# Patient Record
Sex: MALE | Race: WHITE | NOT HISPANIC OR LATINO | URBAN - METROPOLITAN AREA
[De-identification: names, ages, dates, MRNs, and addresses within clinical notes are randomized per-mention and may not be internally consistent; named-entity substitution may affect disease eponyms.]

---

## 2017-10-31 ENCOUNTER — INPATIENT (INPATIENT)
Facility: HOSPITAL | Age: 37
LOS: 5 days | Discharge: HOME | DRG: 137 | End: 2017-11-06
Attending: OTOLARYNGOLOGY | Admitting: OTOLARYNGOLOGY
Payer: COMMERCIAL

## 2017-10-31 VITALS
OXYGEN SATURATION: 95 % | RESPIRATION RATE: 18 BRPM | TEMPERATURE: 98 F | SYSTOLIC BLOOD PRESSURE: 121 MMHG | DIASTOLIC BLOOD PRESSURE: 76 MMHG | WEIGHT: 179.9 LBS | HEART RATE: 94 BPM

## 2017-10-31 LAB
ALBUMIN SERPL ELPH-MCNC: 4.5 G/DL — SIGNIFICANT CHANGE UP (ref 3.3–5)
ALP SERPL-CCNC: 95 U/L — SIGNIFICANT CHANGE UP (ref 40–120)
ALT FLD-CCNC: 11 U/L RC — SIGNIFICANT CHANGE UP (ref 10–45)
ANION GAP SERPL CALC-SCNC: 17 MMOL/L — SIGNIFICANT CHANGE UP (ref 5–17)
APTT BLD: 29.2 SEC — SIGNIFICANT CHANGE UP (ref 27.5–37.4)
AST SERPL-CCNC: 16 U/L — SIGNIFICANT CHANGE UP (ref 10–40)
BASE EXCESS BLDV CALC-SCNC: 2.5 MMOL/L — HIGH (ref -2–2)
BASOPHILS # BLD AUTO: 0 K/UL — SIGNIFICANT CHANGE UP (ref 0–0.2)
BASOPHILS NFR BLD AUTO: 0.3 % — SIGNIFICANT CHANGE UP (ref 0–2)
BILIRUB SERPL-MCNC: 0.9 MG/DL — SIGNIFICANT CHANGE UP (ref 0.2–1.2)
BUN SERPL-MCNC: 15 MG/DL — SIGNIFICANT CHANGE UP (ref 7–23)
CA-I SERPL-SCNC: 1.25 MMOL/L — SIGNIFICANT CHANGE UP (ref 1.12–1.3)
CALCIUM SERPL-MCNC: 10 MG/DL — SIGNIFICANT CHANGE UP (ref 8.4–10.5)
CHLORIDE BLDV-SCNC: 100 MMOL/L — SIGNIFICANT CHANGE UP (ref 96–108)
CHLORIDE SERPL-SCNC: 98 MMOL/L — SIGNIFICANT CHANGE UP (ref 96–108)
CO2 BLDV-SCNC: 30 MMOL/L — SIGNIFICANT CHANGE UP (ref 22–30)
CO2 SERPL-SCNC: 25 MMOL/L — SIGNIFICANT CHANGE UP (ref 22–31)
CREAT SERPL-MCNC: 1.04 MG/DL — SIGNIFICANT CHANGE UP (ref 0.5–1.3)
EOSINOPHIL # BLD AUTO: 0 K/UL — SIGNIFICANT CHANGE UP (ref 0–0.5)
EOSINOPHIL NFR BLD AUTO: 0.1 % — SIGNIFICANT CHANGE UP (ref 0–6)
GAS PNL BLDV: 139 MMOL/L — SIGNIFICANT CHANGE UP (ref 136–145)
GAS PNL BLDV: SIGNIFICANT CHANGE UP
GAS PNL BLDV: SIGNIFICANT CHANGE UP
GLUCOSE BLDV-MCNC: 112 MG/DL — HIGH (ref 70–99)
GLUCOSE SERPL-MCNC: 114 MG/DL — HIGH (ref 70–99)
HCO3 BLDV-SCNC: 28 MMOL/L — SIGNIFICANT CHANGE UP (ref 21–29)
HCT VFR BLD CALC: 42 % — SIGNIFICANT CHANGE UP (ref 39–50)
HCT VFR BLDA CALC: 46 % — SIGNIFICANT CHANGE UP (ref 39–50)
HGB BLD CALC-MCNC: 15 G/DL — SIGNIFICANT CHANGE UP (ref 13–17)
HGB BLD-MCNC: 14.5 G/DL — SIGNIFICANT CHANGE UP (ref 13–17)
INR BLD: 1.12 RATIO — SIGNIFICANT CHANGE UP (ref 0.88–1.16)
LACTATE BLDV-MCNC: 1 MMOL/L — SIGNIFICANT CHANGE UP (ref 0.7–2)
LYMPHOCYTES # BLD AUTO: 1.2 K/UL — SIGNIFICANT CHANGE UP (ref 1–3.3)
LYMPHOCYTES # BLD AUTO: 7.5 % — LOW (ref 13–44)
MCHC RBC-ENTMCNC: 31.5 PG — SIGNIFICANT CHANGE UP (ref 27–34)
MCHC RBC-ENTMCNC: 34.4 GM/DL — SIGNIFICANT CHANGE UP (ref 32–36)
MCV RBC AUTO: 91.7 FL — SIGNIFICANT CHANGE UP (ref 80–100)
MONOCYTES # BLD AUTO: 1.6 K/UL — HIGH (ref 0–0.9)
MONOCYTES NFR BLD AUTO: 10.2 % — SIGNIFICANT CHANGE UP (ref 2–14)
NEUTROPHILS # BLD AUTO: 13.2 K/UL — HIGH (ref 1.8–7.4)
NEUTROPHILS NFR BLD AUTO: 81.9 % — HIGH (ref 43–77)
PCO2 BLDV: 50 MMHG — SIGNIFICANT CHANGE UP (ref 35–50)
PH BLDV: 7.37 — SIGNIFICANT CHANGE UP (ref 7.35–7.45)
PLATELET # BLD AUTO: 192 K/UL — SIGNIFICANT CHANGE UP (ref 150–400)
PO2 BLDV: 25 MMHG — SIGNIFICANT CHANGE UP (ref 25–45)
POTASSIUM BLDV-SCNC: 3.8 MMOL/L — SIGNIFICANT CHANGE UP (ref 3.5–5)
POTASSIUM SERPL-MCNC: 4.1 MMOL/L — SIGNIFICANT CHANGE UP (ref 3.5–5.3)
POTASSIUM SERPL-SCNC: 4.1 MMOL/L — SIGNIFICANT CHANGE UP (ref 3.5–5.3)
PROT SERPL-MCNC: 8.4 G/DL — HIGH (ref 6–8.3)
PROTHROM AB SERPL-ACNC: 12.1 SEC — SIGNIFICANT CHANGE UP (ref 9.8–12.7)
RBC # BLD: 4.59 M/UL — SIGNIFICANT CHANGE UP (ref 4.2–5.8)
RBC # FLD: 12.1 % — SIGNIFICANT CHANGE UP (ref 10.3–14.5)
SAO2 % BLDV: 41 % — LOW (ref 67–88)
SODIUM SERPL-SCNC: 140 MMOL/L — SIGNIFICANT CHANGE UP (ref 135–145)
WBC # BLD: 16.1 K/UL — HIGH (ref 3.8–10.5)
WBC # FLD AUTO: 16.1 K/UL — HIGH (ref 3.8–10.5)

## 2017-10-31 PROCEDURE — 70491 CT SOFT TISSUE NECK W/DYE: CPT | Mod: 26

## 2017-10-31 PROCEDURE — 93010 ELECTROCARDIOGRAM REPORT: CPT | Mod: 59

## 2017-10-31 PROCEDURE — 99285 EMERGENCY DEPT VISIT HI MDM: CPT | Mod: 25

## 2017-10-31 RX ORDER — ACETAMINOPHEN 500 MG
1000 TABLET ORAL ONCE
Qty: 0 | Refills: 0 | Status: COMPLETED | OUTPATIENT
Start: 2017-10-31 | End: 2017-10-31

## 2017-10-31 RX ADMIN — Medication 400 MILLIGRAM(S): at 22:42

## 2017-10-31 NOTE — ED PROVIDER NOTE - NS ED ROS FT
ROS: denies HA, weakness, dizziness, nausea/vomiting, chest pain, SOB, diaphoresis, abdominal pain, back/neck pain, dysuria/hematuria, or rash  +jaw pain, fever

## 2017-10-31 NOTE — ED ADULT NURSE NOTE - OBJECTIVE STATEMENT
25 yo M no pmh no medications taken on a daily basis came to ED c/o jaw pain s/p trip and fall into wall at his house on 07:00 sunday, 10/29/17.  Pt states that he fell into the wall head first, denies LOC.  Went to [facility] yesterday and was told that he would not have surgery for several days, and pt left AMA.  Py had slight fever of 100.1 at [facility] and denies being given antibiotic.  Pt states that swelling and pain got worse today, with redness and swelling developing on the neck below the chin.  Denies CP, back pain, SOB, fevers/chills, n/v/d.  A&Ox4, lungs clear.  Pt had visible swelling noted in the jaw, down the neck and near the clavicle.  Pt has tenderness to palpation in the jaw and has limited ROM of the jaw. safety and comfort maintained. Family present at bedside.  Will continue to monitor.

## 2017-10-31 NOTE — ED PROVIDER NOTE - ATTENDING CONTRIBUTION TO CARE
35yo M with recent mandibular fracture from trauma, presenting with worsening pain, swelling and redness over site, fevers at home.  +Trismus, difficulty talking, and swelling of the left maxillary and mandibular regions: concerning for infection: osteomyelitis / abscess/ indu's angina.  IV clindamycin started and CT obtained, with findings concerning for indu's; ENT consulted in the ED, and patient to be admitted for operative stabilization.

## 2017-10-31 NOTE — ED PROVIDER NOTE - OBJECTIVE STATEMENT
36 y.o. male previously healthy sp fall on Sunday after tripping at home and hit left side of face on table, went to [facility] and found to have left sided "jaw fracture and impacted wisdom tooth". Was told to follow up, not prescribed antibiotics. For past 2 days now with fever 101 at home with progressively worsening left sided facial swelling and redness extending to neck. Last dose tylenol this AM. Unable to open jaw due to pain and swelling.

## 2017-10-31 NOTE — ED PROVIDER NOTE - PHYSICAL EXAMINATION
Gen: Well appearing, NAD, AOx3  Head: NCAT  HEENT: PERRL, MMM, normal conjunctiva. Trismus due to left sided facial swelling. Erythema of anterior neck  Lung: CTAB, no rales, rhonchi or wheezing  CV: S1/S2, no murmurs, rubs or gallops  Abd: soft, NTND, no rebound or guarding  MSK: No CVA tenderness. No edema, no visible deformities  Neuro: No focal neurologic deficits.   Skin: Warm and dry, no evidence of rash  Psych: normal mood and affect

## 2017-10-31 NOTE — ED ADULT TRIAGE NOTE - CHIEF COMPLAINT QUOTE
fractured jaw s.p falling into something, needs surgical repair.  today swelling to left side of jaw and neck started. difficulty maintaining secretions

## 2017-10-31 NOTE — ED PROVIDER NOTE - MEDICAL DECISION MAKING DETAILS
36 y.o. male pw left sided jaw pain and swelling with neck erythema likely 2/2 infection after jaw fracture. Will check labs, fluids, CT neck w/contrast, likely abx and admit.

## 2017-11-01 DIAGNOSIS — K12.2 CELLULITIS AND ABSCESS OF MOUTH: ICD-10-CM

## 2017-11-01 DIAGNOSIS — L03.221 CELLULITIS OF NECK: ICD-10-CM

## 2017-11-01 DIAGNOSIS — S02.642K: ICD-10-CM

## 2017-11-01 DIAGNOSIS — J04.30 SUPRAGLOTTITIS, UNSPECIFIED, WITHOUT OBSTRUCTION: ICD-10-CM

## 2017-11-01 LAB
ANION GAP SERPL CALC-SCNC: 13 MMOL/L — SIGNIFICANT CHANGE UP (ref 5–17)
BASOPHILS # BLD AUTO: 0 K/UL — SIGNIFICANT CHANGE UP (ref 0–0.2)
BASOPHILS NFR BLD AUTO: 0 % — SIGNIFICANT CHANGE UP (ref 0–2)
BLD GP AB SCN SERPL QL: NEGATIVE — SIGNIFICANT CHANGE UP
BUN SERPL-MCNC: 15 MG/DL — SIGNIFICANT CHANGE UP (ref 7–23)
CALCIUM SERPL-MCNC: 9.4 MG/DL — SIGNIFICANT CHANGE UP (ref 8.4–10.5)
CHLORIDE SERPL-SCNC: 99 MMOL/L — SIGNIFICANT CHANGE UP (ref 96–108)
CO2 SERPL-SCNC: 27 MMOL/L — SIGNIFICANT CHANGE UP (ref 22–31)
CREAT SERPL-MCNC: 1.04 MG/DL — SIGNIFICANT CHANGE UP (ref 0.5–1.3)
EOSINOPHIL # BLD AUTO: 0 K/UL — SIGNIFICANT CHANGE UP (ref 0–0.5)
EOSINOPHIL NFR BLD AUTO: 0 % — SIGNIFICANT CHANGE UP (ref 0–6)
GAS PNL BLDA: SIGNIFICANT CHANGE UP
GLUCOSE SERPL-MCNC: 104 MG/DL — HIGH (ref 70–99)
HCT VFR BLD CALC: 38.2 % — LOW (ref 39–50)
HGB BLD-MCNC: 12.9 G/DL — LOW (ref 13–17)
LYMPHOCYTES # BLD AUTO: 1.02 K/UL — SIGNIFICANT CHANGE UP (ref 1–3.3)
LYMPHOCYTES # BLD AUTO: 7 % — LOW (ref 13–44)
MCHC RBC-ENTMCNC: 29.8 PG — SIGNIFICANT CHANGE UP (ref 27–34)
MCHC RBC-ENTMCNC: 33.8 GM/DL — SIGNIFICANT CHANGE UP (ref 32–36)
MCV RBC AUTO: 88.2 FL — SIGNIFICANT CHANGE UP (ref 80–100)
MONOCYTES # BLD AUTO: 1.27 K/UL — HIGH (ref 0–0.9)
MONOCYTES NFR BLD AUTO: 8.7 % — SIGNIFICANT CHANGE UP (ref 2–14)
NEUTROPHILS # BLD AUTO: 12.27 K/UL — HIGH (ref 1.8–7.4)
NEUTROPHILS NFR BLD AUTO: 80 % — HIGH (ref 43–77)
PLATELET # BLD AUTO: 207 K/UL — SIGNIFICANT CHANGE UP (ref 150–400)
POTASSIUM SERPL-MCNC: 4.1 MMOL/L — SIGNIFICANT CHANGE UP (ref 3.5–5.3)
POTASSIUM SERPL-SCNC: 4.1 MMOL/L — SIGNIFICANT CHANGE UP (ref 3.5–5.3)
RBC # BLD: 4.33 M/UL — SIGNIFICANT CHANGE UP (ref 4.2–5.8)
RBC # FLD: 13.8 % — SIGNIFICANT CHANGE UP (ref 10.3–14.5)
RH IG SCN BLD-IMP: POSITIVE — SIGNIFICANT CHANGE UP
RH IG SCN BLD-IMP: POSITIVE — SIGNIFICANT CHANGE UP
SODIUM SERPL-SCNC: 139 MMOL/L — SIGNIFICANT CHANGE UP (ref 135–145)
WBC # BLD: 14.56 K/UL — HIGH (ref 3.8–10.5)
WBC # FLD AUTO: 14.56 K/UL — HIGH (ref 3.8–10.5)

## 2017-11-01 PROCEDURE — 71020: CPT | Mod: 26

## 2017-11-01 PROCEDURE — 71010: CPT | Mod: 26,59

## 2017-11-01 RX ORDER — ONDANSETRON 8 MG/1
4 TABLET, FILM COATED ORAL ONCE
Qty: 0 | Refills: 0 | Status: DISCONTINUED | OUTPATIENT
Start: 2017-11-01 | End: 2017-11-02

## 2017-11-01 RX ORDER — OXYCODONE AND ACETAMINOPHEN 5; 325 MG/1; MG/1
2 TABLET ORAL EVERY 4 HOURS
Qty: 0 | Refills: 0 | Status: DISCONTINUED | OUTPATIENT
Start: 2017-11-01 | End: 2017-11-01

## 2017-11-01 RX ORDER — SODIUM CHLORIDE 9 MG/ML
1000 INJECTION, SOLUTION INTRAVENOUS
Qty: 0 | Refills: 0 | Status: DISCONTINUED | OUTPATIENT
Start: 2017-11-01 | End: 2017-11-03

## 2017-11-01 RX ORDER — SODIUM CHLORIDE 9 MG/ML
1000 INJECTION, SOLUTION INTRAVENOUS ONCE
Qty: 0 | Refills: 0 | Status: COMPLETED | OUTPATIENT
Start: 2017-11-01 | End: 2017-11-01

## 2017-11-01 RX ORDER — OXYCODONE AND ACETAMINOPHEN 5; 325 MG/1; MG/1
1 TABLET ORAL EVERY 4 HOURS
Qty: 0 | Refills: 0 | Status: DISCONTINUED | OUTPATIENT
Start: 2017-11-01 | End: 2017-11-01

## 2017-11-01 RX ORDER — ACETAMINOPHEN 500 MG
650 TABLET ORAL EVERY 6 HOURS
Qty: 0 | Refills: 0 | Status: DISCONTINUED | OUTPATIENT
Start: 2017-11-01 | End: 2017-11-01

## 2017-11-01 RX ORDER — HYDROMORPHONE HYDROCHLORIDE 2 MG/ML
0.5 INJECTION INTRAMUSCULAR; INTRAVENOUS; SUBCUTANEOUS
Qty: 0 | Refills: 0 | Status: DISCONTINUED | OUTPATIENT
Start: 2017-11-01 | End: 2017-11-01

## 2017-11-01 RX ORDER — DEXAMETHASONE 0.5 MG/5ML
10 ELIXIR ORAL EVERY 8 HOURS
Qty: 0 | Refills: 0 | Status: COMPLETED | OUTPATIENT
Start: 2017-11-01 | End: 2017-11-02

## 2017-11-01 RX ORDER — PROPOFOL 10 MG/ML
5 INJECTION, EMULSION INTRAVENOUS
Qty: 500 | Refills: 0 | Status: DISCONTINUED | OUTPATIENT
Start: 2017-11-01 | End: 2017-11-03

## 2017-11-01 RX ORDER — SODIUM CHLORIDE 9 MG/ML
1000 INJECTION INTRAMUSCULAR; INTRAVENOUS; SUBCUTANEOUS
Qty: 0 | Refills: 0 | Status: DISCONTINUED | OUTPATIENT
Start: 2017-11-01 | End: 2017-11-01

## 2017-11-01 RX ORDER — ENOXAPARIN SODIUM 100 MG/ML
40 INJECTION SUBCUTANEOUS DAILY
Qty: 0 | Refills: 0 | Status: DISCONTINUED | OUTPATIENT
Start: 2017-11-01 | End: 2017-11-01

## 2017-11-01 RX ORDER — ONDANSETRON 8 MG/1
4 TABLET, FILM COATED ORAL EVERY 6 HOURS
Qty: 0 | Refills: 0 | Status: DISCONTINUED | OUTPATIENT
Start: 2017-11-01 | End: 2017-11-01

## 2017-11-01 RX ORDER — PIPERACILLIN AND TAZOBACTAM 4; .5 G/20ML; G/20ML
3.38 INJECTION, POWDER, LYOPHILIZED, FOR SOLUTION INTRAVENOUS EVERY 6 HOURS
Qty: 0 | Refills: 0 | Status: DISCONTINUED | OUTPATIENT
Start: 2017-11-01 | End: 2017-11-01

## 2017-11-01 RX ORDER — ENOXAPARIN SODIUM 100 MG/ML
40 INJECTION SUBCUTANEOUS EVERY 24 HOURS
Qty: 0 | Refills: 0 | Status: DISCONTINUED | OUTPATIENT
Start: 2017-11-01 | End: 2017-11-06

## 2017-11-01 RX ORDER — FENTANYL CITRATE 50 UG/ML
1 INJECTION INTRAVENOUS
Qty: 5000 | Refills: 0 | Status: DISCONTINUED | OUTPATIENT
Start: 2017-11-01 | End: 2017-11-03

## 2017-11-01 RX ORDER — CHLORHEXIDINE GLUCONATE 213 G/1000ML
15 SOLUTION TOPICAL THREE TIMES A DAY
Qty: 0 | Refills: 0 | Status: DISCONTINUED | OUTPATIENT
Start: 2017-11-01 | End: 2017-11-03

## 2017-11-01 RX ORDER — HYDROMORPHONE HYDROCHLORIDE 2 MG/ML
0.5 INJECTION INTRAMUSCULAR; INTRAVENOUS; SUBCUTANEOUS
Qty: 0 | Refills: 0 | Status: DISCONTINUED | OUTPATIENT
Start: 2017-11-01 | End: 2017-11-03

## 2017-11-01 RX ORDER — ACETAMINOPHEN 500 MG
1000 TABLET ORAL ONCE
Qty: 0 | Refills: 0 | Status: COMPLETED | OUTPATIENT
Start: 2017-11-01 | End: 2017-11-01

## 2017-11-01 RX ORDER — DEXMEDETOMIDINE HYDROCHLORIDE IN 0.9% SODIUM CHLORIDE 4 UG/ML
0.2 INJECTION INTRAVENOUS
Qty: 200 | Refills: 0 | Status: DISCONTINUED | OUTPATIENT
Start: 2017-11-01 | End: 2017-11-02

## 2017-11-01 RX ORDER — PIPERACILLIN AND TAZOBACTAM 4; .5 G/20ML; G/20ML
3.38 INJECTION, POWDER, LYOPHILIZED, FOR SOLUTION INTRAVENOUS EVERY 8 HOURS
Qty: 0 | Refills: 0 | Status: DISCONTINUED | OUTPATIENT
Start: 2017-11-01 | End: 2017-11-01

## 2017-11-01 RX ORDER — DEXAMETHASONE 0.5 MG/5ML
10 ELIXIR ORAL EVERY 8 HOURS
Qty: 0 | Refills: 0 | Status: DISCONTINUED | OUTPATIENT
Start: 2017-11-01 | End: 2017-11-01

## 2017-11-01 RX ORDER — HYDROMORPHONE HYDROCHLORIDE 2 MG/ML
0.25 INJECTION INTRAMUSCULAR; INTRAVENOUS; SUBCUTANEOUS
Qty: 0 | Refills: 0 | Status: DISCONTINUED | OUTPATIENT
Start: 2017-11-01 | End: 2017-11-01

## 2017-11-01 RX ORDER — PANTOPRAZOLE SODIUM 20 MG/1
40 TABLET, DELAYED RELEASE ORAL EVERY 24 HOURS
Qty: 0 | Refills: 0 | Status: DISCONTINUED | OUTPATIENT
Start: 2017-11-01 | End: 2017-11-03

## 2017-11-01 RX ORDER — METOCLOPRAMIDE HCL 10 MG
10 TABLET ORAL ONCE
Qty: 0 | Refills: 0 | Status: DISCONTINUED | OUTPATIENT
Start: 2017-11-01 | End: 2017-11-02

## 2017-11-01 RX ADMIN — SODIUM CHLORIDE 100 MILLILITER(S): 9 INJECTION INTRAMUSCULAR; INTRAVENOUS; SUBCUTANEOUS at 02:29

## 2017-11-01 RX ADMIN — DEXMEDETOMIDINE HYDROCHLORIDE IN 0.9% SODIUM CHLORIDE 4.08 MICROGRAM(S)/KG/HR: 4 INJECTION INTRAVENOUS at 12:56

## 2017-11-01 RX ADMIN — Medication 10 MILLIGRAM(S): at 14:36

## 2017-11-01 RX ADMIN — Medication 1000 MILLIGRAM(S): at 00:15

## 2017-11-01 RX ADMIN — SODIUM CHLORIDE 4000 MILLILITER(S): 9 INJECTION, SOLUTION INTRAVENOUS at 15:15

## 2017-11-01 RX ADMIN — SODIUM CHLORIDE 4000 MILLILITER(S): 9 INJECTION, SOLUTION INTRAVENOUS at 14:00

## 2017-11-01 RX ADMIN — Medication 10 MILLIGRAM(S): at 21:51

## 2017-11-01 RX ADMIN — Medication 100 MILLIGRAM(S): at 20:16

## 2017-11-01 RX ADMIN — PIPERACILLIN AND TAZOBACTAM 25 GRAM(S): 4; .5 INJECTION, POWDER, LYOPHILIZED, FOR SOLUTION INTRAVENOUS at 07:40

## 2017-11-01 RX ADMIN — Medication 400 MILLIGRAM(S): at 10:20

## 2017-11-01 RX ADMIN — Medication 100 MILLIGRAM(S): at 00:15

## 2017-11-01 RX ADMIN — Medication 1000 MILLIGRAM(S): at 10:30

## 2017-11-01 RX ADMIN — SODIUM CHLORIDE 100 MILLILITER(S): 9 INJECTION INTRAMUSCULAR; INTRAVENOUS; SUBCUTANEOUS at 10:21

## 2017-11-01 RX ADMIN — OXYCODONE AND ACETAMINOPHEN 2 TABLET(S): 5; 325 TABLET ORAL at 05:30

## 2017-11-01 RX ADMIN — PANTOPRAZOLE SODIUM 40 MILLIGRAM(S): 20 TABLET, DELAYED RELEASE ORAL at 16:17

## 2017-11-01 RX ADMIN — OXYCODONE AND ACETAMINOPHEN 2 TABLET(S): 5; 325 TABLET ORAL at 06:14

## 2017-11-01 RX ADMIN — FENTANYL CITRATE 4.08 MICROGRAM(S)/KG/HR: 50 INJECTION INTRAVENOUS at 14:21

## 2017-11-01 RX ADMIN — Medication 102 MILLIGRAM(S): at 06:31

## 2017-11-01 RX ADMIN — PROPOFOL 2.45 MICROGRAM(S)/KG/MIN: 10 INJECTION, EMULSION INTRAVENOUS at 13:08

## 2017-11-01 RX ADMIN — ENOXAPARIN SODIUM 40 MILLIGRAM(S): 100 INJECTION SUBCUTANEOUS at 20:30

## 2017-11-01 NOTE — H&P ADULT - HISTORY OF PRESENT ILLNESS
35 y/o male with no significant PMHx s/p fall on Sunday after tripping at home and hit left side of face on table, went to [facility] and found to have left sided "jaw fracture and impacted wisdom tooth". Was told to follow up, not prescribed antibiotics. For past 2 days now with fever 101 at home with progressively worsening left sided facial swelling and redness extending to neck. Last dose tylenol this AM. Unable to open jaw due to pain and swelling. Pt denies any SOB, n/v, changes in vision, numbness/tingling, headaches, throat pain, neck pain, or issues tolerating secretions. Pt hasnt been able to eat due to pain from trying to open his jaws. Pt states he has been treated for staph infections outpatient intermittently over the past 3 years.

## 2017-11-01 NOTE — CONSULT NOTE ADULT - ASSESSMENT
36 year old male, s/p fall 3 days ago, presents with Santana's angina.     Neuro: pain controlled with IV Tylenol, if needs narcotic, would give low dose.     Resp: closely monitor saturation 99% on RA, does not require supplemental O2, if worsens clinically, would intubate preemptively. I do not know if his clinical course would worsen, but I think he can be watched for now, he will be in PACU (monitored setting).     CV: no issues as far as I know.     GI: Nil Per Os, no oral meds     : intravenous fluid to prevent dehydration.     ID: Piperacillin / Tazobactam to cover gram negative and gram positive organism and clindamycin to cover anaerobes.      Heme: chemical and mechanical venous thromboembolism prophylaxis.     Disposition: PACU (monitored setting), SICU care.     Full support in place.   Discussed with Dr. Tyesha Parrish (intensivist)     Julia Ross   PGY-2   SICU resident   [id number] 36 year old male, s/p fall 3 days ago, presents with Santana's angina.     Neuro: pain controlled with IV Tylenol, if needs narcotic, would give low dose.     Resp: closely monitor saturation 99% on RA, does not require supplemental O2, if worsens clinically, would intubate preemptively. I do not know if his clinical course would worsen, but I think he can be watched for now, he will be in PACU (monitored setting).     CV: no issues as far as I know.     GI: Nil Per Os, no oral meds     : intravenous fluid to prevent dehydration.     ID: Piperacillin / Tazobactam to cover gram negative and gram positive organism and clindamycin to cover anaerobes and prevents release of endotoxin from gram-negative bacteria.     Heme: chemical and mechanical venous thromboembolism prophylaxis.     Disposition: PACU (monitored setting), SICU care.     Full support in place.   Discussed with Dr. Tyesha Parrish (intensivist)     Julia Ross   PGY-2   SICU resident   [id number]

## 2017-11-01 NOTE — PROGRESS NOTE ADULT - ASSESSMENT
36y POD #0 s/p I&D for sublingual abscess and jaw wired shut to correct left displaced ramus fracture, doing well. dressing clean and appears dry.

## 2017-11-01 NOTE — H&P ADULT - NSHPLABSRESULTS_GEN_ALL_CORE
CT of neck w/ con     IMPRESSION:  1.    Comminuted  displaced  fracture  through  the  angle  of  the  mandible  on  the  left  side.    Fracture  lucency  extends  through  the  socket  of  the  left  lower  third  molar  and  traverses  the  mandibular  canal.  2.    Ill-defined  hypoattenuation  for  the  mouth  the  right  side  suggesting  phlegmon/early  abscess  related  to  sublingual  gland  infection;  this  measures  approximately  3  x  1  x  1.5  cm.    Diffuse  infiltration  of  the  superficial  and  deep  subcutaneous  fat  along  the  anterior  neck  incision  bilaterally,  tracking  to  the  anterior  chest  wall  suggesting  overlying  cellulitis.    Hyperemia  of  the  submandibular  glands  and  hyperemic  cervical  lymph  nodes  are  likely  reactive.  3.    Asymmetric  effacement  of  the  vallecula  on  the  right  side  likely  related  to  edema  from  the  floor  of  mouth  infection.    Follow-up  CT  or  MRI  neck  after  therapy  and  resolution  of  symptoms  and/or  direct  endoscopic  visualization  by  ENT  can  be  performed  to  exclude  a  base  of  tongue  lesion.

## 2017-11-01 NOTE — ED ADULT NURSE REASSESSMENT NOTE - NS ED NURSE REASSESS COMMENT FT1
vss. aaox4. Gross neuro intact. Lungs clear throughout bilat. S1 and S2 heard. Abd soft, nontender, nondistended. + bs in all 4 quadrants. Denies urianry s&s. Skin w.d.i. Pt denies chest pain, sob, n,v, dizziness ,weakness, fever, chills. Pt states his pain is better than before. Safety and comfort measures maintained. Awaiting OR. vss. aaox4. Gross neuro intact. Lungs clear throughout bilat. S1 and S2 heard. Abd soft, nontender, nondistended. + bs in all 4 quadrants. Denies urianry s&s. Skin w.d.i. Pt denies chest pain, sob, n,v, dizziness ,weakness, fever, chills. Pt states his pain is better than before. Safety and comfort measures maintained. Awaiting OR. Pt is able to speak slightly in full sentences. Pt is not cyanotic or in distress.

## 2017-11-01 NOTE — H&P ADULT - PROBLEM SELECTOR PLAN 1
-NPO  -IV Fluids  -IV decadron  -IV zosyn/clindamycin  -Antipyretics/pain control  -DVT PPX- Venodynes  -Discussed with Dr. Bartholomew

## 2017-11-01 NOTE — H&P ADULT - NSHPPHYSICALEXAM_GEN_ALL_CORE
PHYSICAL EXAM:  Gen: Awake and alert, sitting up, NAD, well-developed  Head: Normocephalic, Atraumatic  Face: (+) left facial edema and trismus of mouth from pain. (-)erythema/fluctuance, parotid glands soft without mass  Eyes: PERRL, EOMI, no scleral injection  Ears: Right - ear canal clear, TM intact without effusion            Left - ear canal clear, TM intact without effusion  Nose: Nares bilaterally patent, no discharge  Mouth: Mucosa moist, tongue/uvula midline, oropharynx clear. FOM-warm, erythematous, slight TTP. (+) trimus  Neck: Anterior neck erythema and edema descending downwards. Flat, supple, no lymphadenopathy, trachea midline, no masses  Resp: breathing easily, no stridor  CV: No peripheral edema or cyanosis    FOE/LARYNGOSCOPY: Moderate amount of generalized supraglottic edema and erythema. Otherwise, nasopharynx, oropharynx and hypopharynx clear. Airway widely patent. Vocal cords intact and in motion b/l. No evidence of bleeding, obstruction, lesions, masses, pooling of secretion.

## 2017-11-01 NOTE — PROGRESS NOTE ADULT - SUBJECTIVE AND OBJECTIVE BOX
POD/STATUS POST/ENT ISSUE: POD #0 for I&D of sublingual abscess     INTERVAL HPI: 36y male with ludwigs angina, sublingual abscess and left displaced ramus fracture s/p I&D for abscess. Pt     Vital Signs Last 24 Hrs  T(C): 36.6 (01 Nov 2017 20:00), Max: 37.8 (01 Nov 2017 06:14)  T(F): 97.9 (01 Nov 2017 20:00), Max: 100 (01 Nov 2017 06:14)  HR: 51 (01 Nov 2017 20:53) (50 - 89)  BP: 104/56 (01 Nov 2017 20:45) (100/53 - 138/88)  BP(mean): 74 (01 Nov 2017 20:45) (72 - 99)  RR: 12 (01 Nov 2017 20:45) (12 - 20)  SpO2: 100% (01 Nov 2017 20:53) (95% - 100%)    LABS:                        12.9   14.56 )-----------( 207      ( 01 Nov 2017 07:32 )             38.2       PHYSICAL EXAM:  Gen: NAD, well-developed  Head: Normocephalic, Atraumatic  Face: no edema/erythema/fluctuance, parotid glands soft without mass  Eyes: PERRL, EOMI, no scleral injection  Ears: Right - ear canal clear, TM intact without effusion / erythema.  No evidence of any fluid drainage.  No Mastoid tenderness / erythema/ ear bulging            Left - ear canal clear, TM intact without effusion / erythema.  No evidence of any fluid drainage.  No Mastoid tenderness / erythema/ ear bulging  Nose: Nares bilaterally patent, no discharge  Mouth: No Stridor / Drooling / Trismus.  Mucosa moist, tongue/uvula midline, oropharynx clear  Neck: Flat, supple, no lymphadenopathy, trachea midline, no masses  Resp: breathing easily, no stridor  CV: no peripheral edema/cyanosis    Indirect Fiberopic laryngoscopy: (With Scope #2)    IMAGING/ADDITIONAL STUDIES: POD/STATUS POST/ENT ISSUE: POD #0 for I&D of sublingual abscess     INTERVAL HPI: 36y male with ludwigs angina, sublingual abscess and left displaced ramus fracture s/p fall 3days ago. POD #0 for I&D for abscess. Pt seen at bedside sleeping. no complaints, pain well controlled.     Vital Signs Last 24 Hrs  T(C): 36.6 (01 Nov 2017 20:00), Max: 37.8 (01 Nov 2017 06:14)  T(F): 97.9 (01 Nov 2017 20:00), Max: 100 (01 Nov 2017 06:14)  HR: 51 (01 Nov 2017 20:53) (50 - 89)  BP: 104/56 (01 Nov 2017 20:45) (100/53 - 138/88)  BP(mean): 74 (01 Nov 2017 20:45) (72 - 99)  RR: 12 (01 Nov 2017 20:45) (12 - 20)  SpO2: 100% (01 Nov 2017 20:53) (95% - 100%)    LABS:                        12.9   14.56 )-----------( 207      ( 01 Nov 2017 07:32 )             38.2       PHYSICAL EXAM:  Gen: NAD, well-developed  Head: Normocephalic,   Face: no edema/erythema/fluctuance  Eyes:  no scleral injection  Nose: Nares bilaterally patent, no discharge  Mouth: mouth wired shut   Neck: Dressing is clean and dry  Resp: breathing easily, no stridor  CV: no peripheral edema/cyanosis

## 2017-11-01 NOTE — PACU DISCHARGE NOTE - COMMENTS
patient is to be transferred to [facility] as per SICU for management by Dr Bartholomew's team there

## 2017-11-01 NOTE — H&P ADULT - ASSESSMENT
37 y/o male with early lugwigs angina with right side of mouth abscess measuring 3 x 1 x 1.5 cm/anterior neck cellulitis/ Unilateral communicated displaced left sided ramus fracture. FOE indicative of supraglottitis. Per exam-Descending cellulitis of anterior neck and FOM tenderness and mild edema. (+) Left facial edema/ trismus. 35 y/o male with early lugwigs angina with right side of mouth sublingual abscess measuring 3 x 1 x 1.5 cm/anterior neck cellulitis/ Unilateral communicated displaced left sided ramus fracture. FOE indicative of supraglottitis. Per exam-Descending cellulitis of anterior neck and FOM tenderness and mild edema. (+) Left facial edema/ trismus.

## 2017-11-01 NOTE — CONSULT NOTE ADULT - ASSESSMENT
Assessment:   The patient is a 36 year old male previously healthy s/p fall on Sunday after tripping at home and hit left side of face on table, went to [facility] and found to have left sided "jaw fracture and impacted wisdom tooth" who presents to the emergency room at [facility] with a chief complaint of neck and face pain and swelling worsening for the past several hours. Ddx submandibluar abscess, post traumatic neck edema versus more likely Left mandible fracture with new onset submandibular cellulits/phlegmon, acute sinusitis    Plan:  1) ICU for airway observation  2) IV clindamycin  3) decadron 10mg IV q8hr x 6 doses

## 2017-11-01 NOTE — CONSULT NOTE ADULT - NOSE
nasal/sinus endoscopy: maxillary sinus with fluid bilaterally via inferior meatus, sphenoid sinus clear bilaterally

## 2017-11-01 NOTE — CONSULT NOTE ADULT - ATTENDING COMMENTS
I have seen and examined this patient on admission. 36 year old male with Ludwigs angina. The patient is to go to the OR for drainage with ENT. Respiratory status will be monitored closely and patient will be a critical airway.

## 2017-11-01 NOTE — CONSULT NOTE ADULT - COMMENTS
Vital Signs:  · BP Systolic	121 mm Hg  · BP Diastolic	76 mm Hg  · Heart Rate	94 /min  · Respiration Rate (breaths/min)	18 /min  · Temp (F)	98.3 Degrees F  · Temp (C)	36.8 Degrees C  · Temp site	oral  · SpO2 (%)	95 %  · O2 delivery	room air

## 2017-11-01 NOTE — CHART NOTE - NSCHARTNOTEFT_GEN_A_CORE
laryngo [facility]  Head & Neck Surgery Procedure Note      Name:                     Miguel Finch	             Surgeon:	Bruce Bartholomew MD  					  Date of Procedure:    11/01/2017                                   Assistant:  None    Record Number:	    [id number]                                      Anesthesia:  Local Anesthesia       Preoperative diagnosis:	Dysphagia, unspecified (R13.10)  	  Postoperative diagnosis:	Dysphagia, unspecified (R13.10)    Procedure:		Flexible Fiberoptic Laryngoscopy  ([id number])    INDICATION:  The patient is a 36 year old male previously healthy s/p fall on Sunday after tripping at home and hit left side of face on table, went to [facility] and found to have left sided "jaw fracture and impacted wisdom tooth". Was told to follow up, not prescribed antibiotics. For past 2 days now with fever 101 at home with progressively worsening left sided facial swelling and redness extending to neck. Unable to open jaw due to pain and swelling. Patient has difficulty swallowing. He has facial/sinus pain with nasal drainage. Pt states he has been treated for staph infections outpatient intermittently over the past 3 years.    PROCEDURE: The patient was seen and his bilateral nasal cavities were prepped and sprayed with topical anesthesia of neosynephrine.   Following this, a fiberoptic flexible laryngoscope was passed into the left nasal cavity, and then slowly and meticulously moved posteriorly to the nasopharynx.  There was no evidence of clotted blood within the left anterior and posterior superior lateral nasal wall. There was clear mucous within the nasal cavity.  Once at nasopharynx the skull base and lateral walls of the eustachian tubes were examined for lesions and masses.  There was no blood or masses within the nasopharynx. There was mild prominence of the nasopharyngeal soft tissue consistent with inflammatory reaction.  The fiberoptic endoscope was then carefully directed inferiorly and moved to the hypopharynx and glottis.  There was no fullness of the base of tongue.  There was 1-2+ prominence of the tonsils bilaterally (equally) and without exudate. There was no evidence of retropharyngeal erythema or edema.  There was mild  diffuse erythema edema  of the pharyngeal wall and supraglottic structures.  The true vocal cords appeared to be mobile bilaterally.  There was no evidence of pooling of secretions, or obvious aspiration or penetration of liquid into the glottis.  The patient tolerated the procedure well..

## 2017-11-01 NOTE — CONSULT NOTE ADULT - SUBJECTIVE AND OBJECTIVE BOX
SICU CONSULT NOTE    Patient is a 36y old  Male who presents with a chief complaint of Facial swelling.     HPI:  37 y/o male with no significant PMHx s/p fall on Sunday after tripping at home and hit left side of face on table, went to [facility] and found to have left sided "jaw fracture and impacted wisdom tooth". He was told to follow up, not prescribed antibiotics. For past 2 days now with fever 101 at home with progressively worsening left sided facial swelling and redness extending to neck.  Unable to open jaw due to pain and swelling. Pt denies any SOB, n/v, changes in vision, numbness/tingling, headaches, throat pain. He is able to spit out saliva. Pt has not been able to eat due to pain from trying to open his jaws. Pt states he has been treated for staph infections outpatient intermittently over the past 3 years.       PAST MEDICAL & SURGICAL HISTORY:  No pertinent past medical history  No significant past surgical history    FAMILY HISTORY:  No pertinent family history in first degree relatives    SOCIAL HISTORY: not smoker, social alcohol use.     Home meds: none         MEDICATIONS  (STANDING):  clindamycin IVPB 900 milliGRAM(s) IV Intermittent every 8 hours  dexamethasone  IVPB 10 milliGRAM(s) IV Intermittent every 8 hours  piperacillin/tazobactam IVPB. 3.375 Gram(s) IV Intermittent every 6 hours  sodium chloride 0.9%. 1000 milliLiter(s) (100 mL/Hr) IV Continuous <Continuous>    MEDICATIONS  (PRN):  acetaminophen    Suspension 650 milliGRAM(s) Oral every 6 hours PRN For Temp greater than 38 C (100.4 F)  acetaminophen    Suspension. 650 milliGRAM(s) Oral every 6 hours PRN Mild Pain (1 - 3)  ondansetron Injectable 4 milliGRAM(s) IV Push every 6 hours PRN Nausea  oxyCODONE    5 mG/acetaminophen 325 mG 2 Tablet(s) Oral every 4 hours PRN Severe Pain (7 - 10)  oxyCODONE    5 mG/acetaminophen 325 mG 1 Tablet(s) Oral every 4 hours PRN Moderate Pain (4 - 6)    Allergies: No Known Allergies     Vital Signs Last 24 Hrs  T(C): 36.9 (01 Nov 2017 07:36), Max: 37.8 (01 Nov 2017 06:14)  T(F): 98.4 (01 Nov 2017 07:36), Max: 100 (01 Nov 2017 06:14)  HR: 75 (01 Nov 2017 07:36) (75 - 94)  BP: 116/68 (01 Nov 2017 07:36) (116/68 - 138/88)  BP(mean): --  RR: 16 (01 Nov 2017 07:36) (16 - 18)  SpO2: 97% (01 Nov 2017 07:36) (95% - 99%)  Daily     Daily     Gen: Awake and alert, sitting up, NAD, well-developed  Head: Normocephalic, Atraumatic  Face: left facial edema, unable to fully open mouth from pain. no erythema or fluctuance, parotid glands soft without mass  Eyes: no scleral injection  Nose: Nares bilaterally patent, no discharge  Mouth: Mucosa moist, tongue/uvula midline, oropharynx clear. slight TTP. (+) trimus  Neck: Anterior neck erythema and edema descending downwards. Flat, supple, no lymphadenopathy, trachea midline, no masses  Resp: breathing easily, no stridor  CV: No peripheral edema or cyanosis    ENT FOE/LARYNGOSCOPY: Moderate amount of generalized supraglottic edema and erythema. Otherwise, nasopharynx, oropharynx and hypopharynx clear. Airway widely patent. Vocal cords intact and in motion b/l. No evidence of bleeding, obstruction, lesions, masses, pooling of secretion.                              14.5   16.1  )-----------( 192      ( 31 Oct 2017 22:19 )             42.0     11-01    x   |  x   |  15  ----------------------------<  104<H>  x    |  27  |  1.04    Ca    9.4      01 Nov 2017 07:44    TPro  8.4<H>  /  Alb  4.5  /  TBili  0.9  /  DBili  x   /  AST  16  /  ALT  11  /  AlkPhos  95  10-31    PT/INR - ( 31 Oct 2017 22:19 )   PT: 12.1 sec;   INR: 1.12 ratio         PTT - ( 31 Oct 2017 22:19 )  PTT:29.2 sec      IMAGING STUDIES:  1.  Comminuted displaced fracture through the angle of the mandible on the left side.  Fracture lucency extends through the socket of the left lower third molar and traverses the mandibular canal.  2.  Ill-defined hypoattenuation for the mouth the right side suggesting phlegmon/early abscess related to sublingual gland infection; this measures approximately 3 x 1 x 1.5 cm.  Diffuse infiltration of the superficial and deep subcutaneous fat along the anterior neck incision bilaterally, tracking to the anterior chest wall suggesting overlying cellulitis.  Hyperemia of the submandibular glands and hyperemic cervical lymph nodes are likely reactive.  3.  Asymmetric effacement of the vallecula on the right side likely related to edema from the floor of mouth infection.  Follow-up CT or MRI neck after therapy and resolution of symptoms and/or direct endoscopic visualization by ENT can be performed to exclude a base of tongue lesion.

## 2017-11-01 NOTE — CONSULT NOTE ADULT - SUBJECTIVE AND OBJECTIVE BOX
· HPI Objective Statement: 36 y.o. male previously healthy sp fall on Sunday after tripping at home and hit left side of face on table, went to [facility] and found to have left sided "jaw fracture and impacted wisdom tooth". Was told to follow up, not prescribed antibiotics. For past 2 days now with fever 101 at home with progressively worsening left sided facial swelling and redness extending to neck. Last dose tylenol this AM. Unable to open jaw due to pain and swelling.	    HIV:    HIV Status:  · Offered: Declined	    PAST MEDICAL/SURGICAL/FAMILY/SOCIAL HISTORY:    Past Medical History:  No pertinent past medical history.     Past Surgical History:  No significant past surgical history.     Tobacco Usage:  · Tobacco Usage	Unknown if ever smoked	    ALLERGIES AND HOME MEDICATIONS:   Allergies:        Allergies:  	No Known Allergies:     Home Medications:   * Outpatient Medication Status not yet specified · HPI Objective Statement: The patient is a 36 year old male previously healthy s/p fall on Sunday after tripping at home and hit left side of face on table, went to [facility] and found to have left sided "jaw fracture and impacted wisdom tooth". Was told to follow up, not prescribed antibiotics. For past 2 days now with fever 101 at home with progressively worsening left sided facial swelling and redness extending to neck. Unable to open jaw due to pain and swelling. Patient has difficulty swallowing. He has facial/sinus pain with nasal drainage. Pt states he has been treated for staph infections outpatient intermittently over the past 3 years.	    HIV:    HIV Status:  · Offered: Declined	    PAST MEDICAL/SURGICAL/FAMILY/SOCIAL HISTORY:    Past Medical History:  No pertinent past medical history.     Past Surgical History:  No significant past surgical history.     Tobacco Usage:  · Tobacco Usage	Unknown if ever smoked	    ALLERGIES AND HOME MEDICATIONS:   Allergies:        Allergies:  	No Known Allergies:     Home Medications:   * Outpatient Medication Status not yet specified    CBC Full  -    WBC Count : 14.56 K/uL  Hemoglobin : 12.2 g/dL  Hematocrit : 35.3 %  Platelet Count - Automated : 181 K/uL  Mean Cell Volume : 92.0 fl  Mean Cell Hemoglobin : 31.7 pg  Mean Cell Hemoglobin Concentration : 34.5 gm/dL  Auto Neutrophil # : x  Auto Lymphocyte # : x  Auto Monocyte # : x  Auto Eosinophil # : x  Auto Basophil # : x  Auto Neutrophil % : x  Auto Lymphocyte % : x  Auto Monocyte % : x  Auto Eosinophil % : x  Auto Basophil % : x    IMAGING  -------------------------------------------------------------------------------------------------------------------------------------------  EXAM:  CT NECK SOFT TISSUE IC                          PROCEDURE DATE:  10/31/2017      FINDINGS:  There is a comminuted displaced fracture through the angle of the   mandible on the left side.  The fracture lucency extends through the   socket of the left lower third molar and traverses the mandibular canal.    No other fracture is identified.  The temporomandibular joints are intact   bilaterally.    There is ill-defined hypoattenuation in the floor the mouth the right   side suggesting phlegmon/early abscess related to sublingual gland   infection; this measures approximately 3 x 1 x 1.5 cm (2:124 and 602:43).    There is mild associated mass effect in the floor the mouth.  There is   diffuse infiltration of the superficial and deep subcutaneous fat along   the anterior neck and chin bilaterally, that tracks to the anterior chest   wall suggesting overlying cellulitis.  There is reactive thickening of   the platysma.  Inflammatory changes track along the sternocleidomastoid   muscles and strap muscles bilaterally.    The submandibular glands appear hyperinflated, worse on the right, likely   reactive.  There are hyperemic submandibular and internal jugular chain   lymph nodes bilaterally measuring up to 16 x 10 cm at level IIa on the   right and 20 x 8 mm at level Ib on the right, which are likely reactive.    There is no peritonsillar abscess or retropharyngeal fluid.    The parotid glands and thyroid are unremarkable.    There is asymmetric effacement of the vallecula on the right side.  The   pharyngeal airway, laryngeal structures and upper trachea are otherwise   grossly unremarkable.    The cervical vasculature appears unremarkable.    There is mild pleural-parenchymal scarring at the lung apices.    IMPRESSION:   1.  Comminuted displaced fracture through the angle of the mandible on   the left side.  Fracture lucency extends through the socket of the left   lower third molar and traverses the mandibular canal.  2.  Ill-defined hypoattenuation for the mouth the right side suggesting   phlegmon/early abscess related to sublingual gland infection; this   measures approximately 3 x 1 x 1.5 cm.  Diffuse infiltration of the   superficial and deep subcutaneous fat along the anterior neck incision   bilaterally, tracking to the anterior chest wall suggesting overlying   cellulitis.  Hyperemia of the submandibular glands and hyperemic cervical   lymph nodes are likely reactive.  3.  Asymmetric effacement of the vallecula on the right side likely   related to edema from the floor of mouth infection.  Follow-up CT or MRI   neck after therapy and resolution of symptoms and/or direct endoscopic   visualization by ENT can be performed to exclude a base of tongue lesion.    MOHSEN SHAW D.O., RADIOLOGY RESIDENT  This document has been electronically signed.  SHELLEY PARIS M.D.,ATTENDING RADIOLOGIST  This document has been electronically signed. Nov  1 2017  1:16AM    ------------------------------------------------------------------------------------------------------------------------------------------------------------------------------------------------------------------------------------------------------------------------------

## 2017-11-01 NOTE — CHART NOTE - NSCHARTNOTEFT_GEN_A_CORE
[facility]  Head & Neck Surgery Procedure Note    Name:                        Rivka Lieberman	           Surgeon:	Bruce Bartholomew MD  			  Date of procedure:	      09/13/2017	           Assistant:	None    Record Number:	      [id number]	           Anesthesia	Local Anesthesia     Preoperative diagnosis:	Acute maxillary sinusitis, unspecified (J01.00);  Acute Pansinusitis (J01.40)    Postoperative diagnosis:	Same    Procedure:	              Bilateral maxillary sinus endoscopy  (81686)                                            Diagnostic Sphenoid Sinus Endoscopy (02310)      INDICATION:  The patient is a 33y old female with PMH of obesity, hypothyroidism (s/p [place] for Grave's disease), h/o PE x2 in 2015 (Xarelto stopped since 5/2016), psychogenic non-epileptiform seizures (taken off Keppra), fibromyalgia, presents with chest pain and coughing up blood x 5 days ago.   Pt states she coughs intermittently throughout the day with occasional grape sized clot produced. She occasionaly spits up blood. She also occasionally has bleeding from her nose. She has facial headaches with post nasal drip      PROCEDURE:  The patient was seen and her nasal cavities were prepped and sprayed with topical neosynephrine anesthesia.   Following this, a zero degree flexible endoscope was passed into the left nasal vault, and passed posteriorly to the nasopharynx.  There was no evidence of any blood emanating from the left posterior superior lateral nasal wall. The scope was then slowly withdrawn and then rotated superiorly to visualize the middle turbinate and the inferior meatus and osteomeatal complex. The OMC on the left side appeared patent with no evidence of pus or obstruction.  The Maxillary sinus on the left side was then entered through the inferior meatus.  The maxillary sinus had mild to moderate amount of mucoserous fluid within it without any evidence of masses or lesions.  The frontal duct was then inspected and appeared clear without any mucoid material flowing from it.  The Septum appeared straight.  Next the endoscope was passed posteriorly to the sphenoid sinus. The sphenoid sinus was identified 30 degrees up from the nasal floor and approximately 6cm posterior to the nasal sill. The left sphenoid sinus was entered and had no evidence of purulence or masses. The scope was then slowly withdrawn.  The zero degree endoscope was then introduced into the right nasal cavity and passed posteriorly to the nasopharynx. There were no masses visible.  There was no evidence of any bleeding emanating from the right posterior septum.  The endoscope was then rotated superiorly to visualize the middle turbinate and the inferior meatus and osteomeatal complex. The Maxillary sinus on the right side was then entered via the inferior meatus.  There was a minimal amount of mucoserous fluid within the maxillary sinus with no evidence of any masses or pus.  Again the septum appeared to be straight.  The scope was then passed posteriorly to the sphenoid sinus. The right sphenoid sinus was entered and had a minimal amount of mucoserous material within it.  The scope was then withdrawn.  The patient tolerated the procedure well.  There were no areas of telangiectasia along the anterior septum.  The scope was then withdrawn.  The patient tolerated the procedure well. [facility]  Head & Neck Surgery Procedure Note      Name:                     Miguel Finch	             Surgeon:	Bruce Bartholomew MD  					  Date of Procedure:    11/01/2017                                   Assistant:  None    Record Number:	    [id number]                                      Anesthesia:  Local Anesthesia       Preoperative diagnosis:	Acute maxillary sinusitis, unspecified (J01.00);  Acute Pansinusitis (J01.40)    Postoperative diagnosis:	Same    Procedure:	              Bilateral maxillary sinus endoscopy  (44440)                                            Diagnostic Sphenoid Sinus Endoscopy (44371)      INDICATION:  The patient is a 36 year old male previously healthy s/p fall on Sunday after tripping at home and hit left side of face on table, went to [facility] and found to have left sided "jaw fracture and impacted wisdom tooth". Was told to follow up, not prescribed antibiotics. For past 2 days now with fever 101 at home with progressively worsening left sided facial swelling and redness extending to neck. Unable to open jaw due to pain and swelling. Patient has difficulty swallowing. He has facial/sinus pain with nasal drainage. Pt states he has been treated for staph infections outpatient intermittently over the past 3 years.      PROCEDURE:  The patient was seen and his nasal cavities were prepped and sprayed with topical neosynephrine anesthesia.   Following this, a zero degree flexible endoscope was passed into the left nasal vault, and passed posteriorly to the nasopharynx.  There was no evidence of any blood emanating from the left posterior superior lateral nasal wall. The scope was then slowly withdrawn and then rotated superiorly to visualize the middle turbinate and the inferior meatus and osteomeatal complex. The OMC on the left side appeared patent with no evidence of pus or obstruction.  The Maxillary sinus on the left side was then entered through the inferior meatus.  The maxillary sinus had mild to moderate amount of mucoserous fluid within it without any evidence of masses or lesions.  The frontal duct was then inspected and appeared clear without any mucoid material flowing from it.  The Septum appeared straight.  Next the endoscope was passed posteriorly to the sphenoid sinus. The sphenoid sinus was identified 30 degrees up from the nasal floor and approximately 6cm posterior to the nasal sill. The left sphenoid sinus was entered and had no evidence of purulence or masses. The scope was then slowly withdrawn.  The zero degree endoscope was then introduced into the right nasal cavity and passed posteriorly to the nasopharynx. There were no masses visible.  There was no evidence of any bleeding emanating from the right posterior septum.  The endoscope was then rotated superiorly to visualize the middle turbinate and the inferior meatus and osteomeatal complex. The Maxillary sinus on the right side was then entered via the inferior meatus.  There was a minimal amount of mucoserous fluid within the maxillary sinus with no evidence of any masses or pus.  Again the septum appeared to be straight.  The scope was then passed posteriorly to the sphenoid sinus. The right sphenoid sinus was entered and had a minimal amount of mucoserous material within it.  The scope was then withdrawn.  The patient tolerated the procedure well.  There were no areas of telangiectasia along the anterior septum.  The scope was then withdrawn.  The patient tolerated the procedure well.

## 2017-11-02 LAB
-  CANDIDA ALBICANS: SIGNIFICANT CHANGE UP
-  CANDIDA GLABRATA: SIGNIFICANT CHANGE UP
-  CANDIDA KRUSEI: SIGNIFICANT CHANGE UP
-  CANDIDA PARAPSILOSIS: SIGNIFICANT CHANGE UP
-  CANDIDA TROPICALIS: SIGNIFICANT CHANGE UP
-  COAGULASE NEGATIVE STAPHYLOCOCCUS: SIGNIFICANT CHANGE UP
-  K. PNEUMONIAE GROUP: SIGNIFICANT CHANGE UP
-  KPC RESISTANCE GENE: SIGNIFICANT CHANGE UP
-  STREPTOCOCCUS SP. (NOT GRP A, B OR S PNEUMONIAE): SIGNIFICANT CHANGE UP
A BAUMANNII DNA SPEC QL NAA+PROBE: SIGNIFICANT CHANGE UP
ANION GAP SERPL CALC-SCNC: 12 MMOL/L — SIGNIFICANT CHANGE UP (ref 5–17)
BUN SERPL-MCNC: 16 MG/DL — SIGNIFICANT CHANGE UP (ref 7–23)
CALCIUM SERPL-MCNC: 8.9 MG/DL — SIGNIFICANT CHANGE UP (ref 8.4–10.5)
CHLORIDE SERPL-SCNC: 102 MMOL/L — SIGNIFICANT CHANGE UP (ref 96–108)
CO2 SERPL-SCNC: 24 MMOL/L — SIGNIFICANT CHANGE UP (ref 22–31)
CREAT SERPL-MCNC: 0.72 MG/DL — SIGNIFICANT CHANGE UP (ref 0.5–1.3)
E CLOAC COMP DNA BLD POS QL NAA+PROBE: SIGNIFICANT CHANGE UP
E COLI DNA BLD POS QL NAA+NON-PROBE: SIGNIFICANT CHANGE UP
ENTEROCOC DNA BLD POS QL NAA+NON-PROBE: SIGNIFICANT CHANGE UP
ENTEROCOC DNA BLD POS QL NAA+NON-PROBE: SIGNIFICANT CHANGE UP
GAS PNL BLDA: SIGNIFICANT CHANGE UP
GLUCOSE BLDC GLUCOMTR-MCNC: 108 MG/DL — HIGH (ref 70–99)
GLUCOSE SERPL-MCNC: 201 MG/DL — HIGH (ref 70–99)
GP B STREP DNA BLD POS QL NAA+NON-PROBE: SIGNIFICANT CHANGE UP
GRAM STN FLD: SIGNIFICANT CHANGE UP
HAEM INFLU DNA BLD POS QL NAA+NON-PROBE: SIGNIFICANT CHANGE UP
HCT VFR BLD CALC: 35.3 % — LOW (ref 39–50)
HGB BLD-MCNC: 12.2 G/DL — LOW (ref 13–17)
K OXYTOCA DNA BLD POS QL NAA+NON-PROBE: SIGNIFICANT CHANGE UP
L MONOCYTOG DNA BLD POS QL NAA+NON-PROBE: SIGNIFICANT CHANGE UP
MAGNESIUM SERPL-MCNC: 2.2 MG/DL — SIGNIFICANT CHANGE UP (ref 1.6–2.6)
MCHC RBC-ENTMCNC: 31.7 PG — SIGNIFICANT CHANGE UP (ref 27–34)
MCHC RBC-ENTMCNC: 34.5 GM/DL — SIGNIFICANT CHANGE UP (ref 32–36)
MCV RBC AUTO: 92 FL — SIGNIFICANT CHANGE UP (ref 80–100)
METHOD TYPE: SIGNIFICANT CHANGE UP
MRSA SPEC QL CULT: SIGNIFICANT CHANGE UP
MSSA DNA SPEC QL NAA+PROBE: SIGNIFICANT CHANGE UP
N MEN ISLT CULT: SIGNIFICANT CHANGE UP
P AERUGINOSA DNA BLD POS NAA+NON-PROBE: SIGNIFICANT CHANGE UP
PHOSPHATE SERPL-MCNC: 2.6 MG/DL — SIGNIFICANT CHANGE UP (ref 2.5–4.5)
PLATELET # BLD AUTO: 181 K/UL — SIGNIFICANT CHANGE UP (ref 150–400)
POTASSIUM SERPL-MCNC: 4.2 MMOL/L — SIGNIFICANT CHANGE UP (ref 3.5–5.3)
POTASSIUM SERPL-SCNC: 4.2 MMOL/L — SIGNIFICANT CHANGE UP (ref 3.5–5.3)
PROTEUS SP DNA BLD POS QL NAA+NON-PROBE: SIGNIFICANT CHANGE UP
RBC # BLD: 3.84 M/UL — LOW (ref 4.2–5.8)
RBC # FLD: 12.2 % — SIGNIFICANT CHANGE UP (ref 10.3–14.5)
S MARCESCENS DNA BLD POS NAA+NON-PROBE: SIGNIFICANT CHANGE UP
S PNEUM DNA BLD POS QL NAA+NON-PROBE: SIGNIFICANT CHANGE UP
S PYO DNA BLD POS QL NAA+NON-PROBE: SIGNIFICANT CHANGE UP
SODIUM SERPL-SCNC: 138 MMOL/L — SIGNIFICANT CHANGE UP (ref 135–145)
WBC # BLD: 12 K/UL — HIGH (ref 3.8–10.5)
WBC # FLD AUTO: 12 K/UL — HIGH (ref 3.8–10.5)

## 2017-11-02 PROCEDURE — 71010: CPT | Mod: 26

## 2017-11-02 PROCEDURE — 99291 CRITICAL CARE FIRST HOUR: CPT

## 2017-11-02 RX ORDER — AMPICILLIN SODIUM AND SULBACTAM SODIUM 250; 125 MG/ML; MG/ML
3 INJECTION, POWDER, FOR SUSPENSION INTRAMUSCULAR; INTRAVENOUS EVERY 6 HOURS
Qty: 0 | Refills: 0 | Status: DISCONTINUED | OUTPATIENT
Start: 2017-11-02 | End: 2017-11-06

## 2017-11-02 RX ORDER — AMPICILLIN SODIUM AND SULBACTAM SODIUM 250; 125 MG/ML; MG/ML
INJECTION, POWDER, FOR SUSPENSION INTRAMUSCULAR; INTRAVENOUS
Qty: 0 | Refills: 0 | Status: DISCONTINUED | OUTPATIENT
Start: 2017-11-02 | End: 2017-11-06

## 2017-11-02 RX ORDER — AMPICILLIN SODIUM AND SULBACTAM SODIUM 250; 125 MG/ML; MG/ML
3 INJECTION, POWDER, FOR SUSPENSION INTRAMUSCULAR; INTRAVENOUS ONCE
Qty: 0 | Refills: 0 | Status: COMPLETED | OUTPATIENT
Start: 2017-11-02 | End: 2017-11-02

## 2017-11-02 RX ADMIN — Medication 0.5 MILLIGRAM(S): at 20:55

## 2017-11-02 RX ADMIN — HYDROMORPHONE HYDROCHLORIDE 0.5 MILLIGRAM(S): 2 INJECTION INTRAMUSCULAR; INTRAVENOUS; SUBCUTANEOUS at 23:19

## 2017-11-02 RX ADMIN — Medication 100 MILLIGRAM(S): at 03:06

## 2017-11-02 RX ADMIN — AMPICILLIN SODIUM AND SULBACTAM SODIUM 200 GRAM(S): 250; 125 INJECTION, POWDER, FOR SUSPENSION INTRAMUSCULAR; INTRAVENOUS at 11:20

## 2017-11-02 RX ADMIN — HYDROMORPHONE HYDROCHLORIDE 0.5 MILLIGRAM(S): 2 INJECTION INTRAMUSCULAR; INTRAVENOUS; SUBCUTANEOUS at 23:38

## 2017-11-02 RX ADMIN — Medication 1 DROP(S): at 17:53

## 2017-11-02 RX ADMIN — Medication 10 MILLIGRAM(S): at 05:06

## 2017-11-02 RX ADMIN — PROPOFOL 2.45 MICROGRAM(S)/KG/MIN: 10 INJECTION, EMULSION INTRAVENOUS at 10:39

## 2017-11-02 RX ADMIN — CHLORHEXIDINE GLUCONATE 15 MILLILITER(S): 213 SOLUTION TOPICAL at 22:11

## 2017-11-02 RX ADMIN — PROPOFOL 2.45 MICROGRAM(S)/KG/MIN: 10 INJECTION, EMULSION INTRAVENOUS at 18:33

## 2017-11-02 RX ADMIN — PANTOPRAZOLE SODIUM 40 MILLIGRAM(S): 20 TABLET, DELAYED RELEASE ORAL at 17:53

## 2017-11-02 RX ADMIN — CHLORHEXIDINE GLUCONATE 15 MILLILITER(S): 213 SOLUTION TOPICAL at 14:19

## 2017-11-02 RX ADMIN — AMPICILLIN SODIUM AND SULBACTAM SODIUM 200 GRAM(S): 250; 125 INJECTION, POWDER, FOR SUSPENSION INTRAMUSCULAR; INTRAVENOUS at 17:54

## 2017-11-02 RX ADMIN — SODIUM CHLORIDE 100 MILLILITER(S): 9 INJECTION, SOLUTION INTRAVENOUS at 20:00

## 2017-11-02 RX ADMIN — ENOXAPARIN SODIUM 40 MILLIGRAM(S): 100 INJECTION SUBCUTANEOUS at 20:55

## 2017-11-02 RX ADMIN — SODIUM CHLORIDE 100 MILLILITER(S): 9 INJECTION, SOLUTION INTRAVENOUS at 11:20

## 2017-11-02 RX ADMIN — PROPOFOL 2.45 MICROGRAM(S)/KG/MIN: 10 INJECTION, EMULSION INTRAVENOUS at 20:55

## 2017-11-02 NOTE — PROGRESS NOTE ADULT - SUBJECTIVE AND OBJECTIVE BOX
POD/STATUS POST/ENT ISSUE: POD #1 for I&D of sublingual abscess and MMF    INTERVAL HPI: 37 yo male history of ludwigs angina s/p fall resulting in left side mandible fracture POD #1 for I&D sublingual abcess and MMF. Unable to converse w pt due to intubation and MMF. Pt resting in PACU, gf at bedside. Pain well-controlled.    Vital Signs Last 24 Hrs  T(C): 37 (02 Nov 2017 07:30), Max: 37.2 (01 Nov 2017 12:34)  T(F): 98.6 (02 Nov 2017 07:30), Max: 99 (01 Nov 2017 12:34)  HR: 45 (02 Nov 2017 09:00) (45 - 89)  BP: 100/55 (02 Nov 2017 09:00) (97/55 - 124/82)  BP(mean): 71 (02 Nov 2017 09:00) (69 - 99)  RR: 16 (02 Nov 2017 09:00) (12 - 20)  SpO2: 100% (02 Nov 2017 09:00) (99% - 100%)    PHYSICAL EXAM:  Gen: NAD, well-developed  Head: Normocephalic, Atraumatic  Eyes: PERRL, EOMI, no scleral injection  Nose: Nares bilaterally patent, no discharge, no epistaxis, nasal intubation in left nare  Mouth: Blood tinged saliva, wires in place  Neck: Dressing clean and dry  Resp: no stridor  CV: no peripheral edema/cyanosis    LABS:                        12.2   12.0  )-----------( 181      ( 02 Nov 2017 05:30 )             35.3

## 2017-11-02 NOTE — PROGRESS NOTE ADULT - ASSESSMENT
36y POD #1 s/p I&D for sublingual abscess and jaw wired shut to correct left displaced ramus fracture, doing well. dressing clean and appears dry.

## 2017-11-02 NOTE — PROGRESS NOTE ADULT - ASSESSMENT
ASSESSMENT:  37 y/o male with no significant PMHx presenting with a left mandibular fracture and sublingual gland infection s/p I&D and wiring of the mandible for the fracture. He was nasotracheally intubated and will remain intubated for approx. 2 days per ENT.    PLAN:  Neuro: acute post-op pain, sedation in the setting of intubation  - Monitor mental status.  - Precedex and propofol for sedation in the setting of intubation.  - Fentanyl for analgesic sedation.  - Dilaudid PRN acute pain.    Resp: nasotracheal intubation for mandibular I&D and wiring of mandible  - Monitor pulse oximeter & ABGs.  - Ventilatory support as patient has critical airway and is to remain nasotracheally intubated for 2 days as his mouth is wired shut. Adjust vent settings as needed.    CV: no acute issues  - Monitor vital signs.    GI: mandible wired shut  - Strict NPO.  - Zofran and Reglan PRN nausea.  - Protonix for GERD.    Renal: hematuria likely secondary to traumatic Ignacio insertion  - Monitor I&Os.  - Monitor electrolytes and replete as necessary.  - Monitor hematuria. Flush Ignacio as needed if low UOP decreases.  - LR @ 100 cc/hr as patient is NPO.    Heme: hematuria  - Monitor CBCs.  - Lovenox for VTE prophylaxis.    ID: Santana's angina  - Monitor WBC and temperature.  - Follow up cultures.  - Clindamycin for empiric coverage of Santana's angina.    Endo: no acute issues  - Monitor glucose on BMP.    Disposition:  - Will remain in SICU. Possible plan to transfer to [facility] SICU for more comprehensive ENT care.  - Full code.    Jennifer Ceballos PA-C   [id number]

## 2017-11-02 NOTE — PROGRESS NOTE ADULT - SUBJECTIVE AND OBJECTIVE BOX
HISTORY:  35 y/o male with no significant PMHx s/p fall on Sunday after tripping at home and hitting the left side of face on table. Patient presented to [facility] and was found to have left sided "jaw fracture and impacted wisdom tooth". He was told to follow up outpatient and was not prescribed antibiotics. For past 2 days, patient began spiking fevers with progressively worsening left sided facial swelling and redness extending to the neck. Unable to open jaw due to pain and swelling, causing him to be NPO. CT scan demonstrated Santana's angina with a left mandibular fracture so he was taken to the OR. Patient is s/p I&D of mandibular abscess and wiring of the mandible for the fracture. He was nasotracheally intubated and will remain intubated for approx. 2 days per ENT so SICU was consulted. ROS not obtained as patient is intubated & sedated.    24 HOUR EVENTS: Consulted within the last 24 hours. Ignacio noted to be draining hematuria and had to be flushed twice overnight due to occlusion from clots. Possible plan to transfer to [facility] SICU for more comprehensive ENT care.    SUBJECTIVE/ROS:  [ ] A ten-point review of systems was otherwise negative except as noted.  [x] Due to altered mental status/intubation, subjective information were not able to be obtained from the patient. History was obtained, to the extent possible, from review of the chart and collateral sources of information.    NEURO  RASS: -2  Exam: sedated, easily arousable but falls back asleep unless constantly stimulated, follows commands, NAD  Meds:  ·	dexmedetomidine Infusion 0.2 MICROgram(s)/kG/Hr IV Continuous <Continuous>  ·	fentaNYL   Infusion 1 MICROgram(s)/kG/Hr IV Continuous <Continuous>  ·	HYDROmorphone  Injectable 0.5 milliGRAM(s) IV Push every 3 hours PRN Severe Breakthrough Pain  ·	propofol Infusion 5 MICROgram(s)/kG/Min IV Continuous <Continuous>  [x] Adequacy of sedation and pain control has been assessed and adjusted    RESPIRATORY  RR: 12 (11-02-17 @ 06:30) (12 - 20)  SpO2: 100% (11-02-17 @ 06:30) (99% - 100%)  Exam: clear to auscultation bilaterally  Mechanical Ventilation: Mode: SIMV with PS, RR (machine): 12, RR (patient): 12, FiO2: 40, PEEP: 5, PS: 10, ITime: 1, MAP: 8, PIP: 24  [x] Extubation Readiness Assessed  ABG - ( 02 Nov 2017 05:35 )  pH: 7.46  /  pCO2: 37    /  pO2: 177   / HCO3: 26    / Base Excess: 2.6   /  SaO2: 100     Lactate: 1.6  Meds: none    CARDIOVASCULAR  HR: 48 (11-02-17 @ 06:30) (46 - 89)  BP: 98/54 (11-02-17 @ 06:30) (97/55 - 124/82)  BP(mean): 70 (11-02-17 @ 06:30) (69 - 99)  Exam: regular rhythm, S1S2  Cardiac Rhythm: sinus bradycardia  Perfusion     [x]Adequate   [ ]Inadequate  Mentation   [ ]Normal       [x]Reduced  Extremities  [x]Warm         [ ]Cool  Volume Status [ ]Hypervolemic [x]Euvolemic [ ]Hypovolemic  Meds: none    GI/NUTRITION  Exam: soft, nontender, nondistended  Diet: NPO  Meds:  ·	pantoprazole  Injectable 40 milliGRAM(s) IV Push every 24 hours  ·	metoclopramide Injectable 10 milliGRAM(s) IV Push once PRN Nausea and/or Vomiting  ·	ondansetron Injectable 4 milliGRAM(s) IV Push once PRN Nausea and/or Vomiting    GENITOURINARY  I&O's Detail    11-01 @ 07:01  -  11-02 @ 07:00  --------------------------------------------------------  IN:    dexmedetomidine Infusion: 10.3 mL    fentaNYL  Infusion: 65.6 mL    IV PiggyBack: 50 mL    Lactated Ringers IV Bolus: 1000 mL    lactated ringers.: 1750 mL    propofol Infusion: 376.3 mL  Total IN: 3252.2 mL    OUT:    Indwelling Catheter - Urethral: 2210 mL  Total OUT: 2210 mL    Total NET: 1042.2 mL    138  |  102  |  16  ----------------------------<  201<H>  4.2   |  24  |  0.72    Ca    8.9      02 Nov 2017 05:30  Phos  2.6  Mg     2.2    [x] Ignacio catheter, indication: urine output monitoring in the critically ill  Meds: lactated ringers infuse at 100 mL/hr    HEMATOLOGIC  Meds: enoxaparin Injectable 40 milliGRAM(s) SubCutaneous every 24 hours  [x] VTE Prophylaxis                        12.2   12.0  )-----------( 181      ( 02 Nov 2017 05:30 )             35.3     INFECTIOUS DISEASES  T(C): 36.7 (11-02-17 @ 04:00), Max: 37.2 (11-01-17 @ 09:25)  WBC Count: 12.0 K/uL (11-02 @ 05:30)  Recent Cultures: Specimen Source: .Blood Blood, 11-01 @ 00:25; Results No growth to date.; Gram Stain: --; Organism: --  Meds: clindamycin IVPB 900 milliGRAM(s) IV Intermittent every 8 hours    ENDOCRINE  Capillary Blood Glucose: none  Meds: none    ACCESS DEVICES:  [x] Peripheral IV  [ ] Central Venous Line	[ ] R	[ ] L	[ ] IJ	[ ] Fem	[ ] SC	Placed:   [ ] Arterial Line		[ ] R	[ ] L	[ ] Fem	[ ] Rad	[ ] Ax	Placed:   [ ] PICC:					[ ] Mediport  [x] Urinary Catheter, Date Placed: 11/01/2017  [x] Necessity of urinary, arterial, and venous catheters discussed    OTHER MEDICATIONS: chlorhexidine 0.12% Liquid 15 milliLiter(s) Swish and Spit three times a day    CODE STATUS: Full code.    IMAGING:

## 2017-11-02 NOTE — PROGRESS NOTE ADULT - SUBJECTIVE AND OBJECTIVE BOX
Progress Note:   · Provider Specialty	ENT	      · Subjective and Objective: 	  POD/STATUS POST/ENT ISSUE: POD #1 for I&D of sublingual abscess and MMF    INTERVAL HPI: 37 yo male history of ludwigs angina s/p fall resulting in left side mandible fracture POD #1 for I&D sublingual abcess and MMF. Unable to converse w pt due to intubation and MMF. Pt resting in PACU, gf at bedside. Pain well-controlled.    Vital Signs Last 24 Hrs  T(C): 36.8 (02 Nov 2017 23:00), Max: 37.1 (02 Nov 2017 01:00)  T(F): 98.3 (02 Nov 2017 23:00), Max: 98.8 (02 Nov 2017 01:00)  HR: 50 (02 Nov 2017 23:00) (44 - 58)  BP: 95/51 (02 Nov 2017 22:00) (95/51 - 119/68)  BP(mean): 69 (02 Nov 2017 22:00) (68 - 89)  RR: 14 (02 Nov 2017 23:00) (12 - 33)  SpO2: 100% (02 Nov 2017 23:00) (100% - 100%)    PHYSICAL EXAM:  Gen: NAD, well-developed  Head: Normocephalic, Atraumatic  Eyes: PERRL, EOMI, no scleral injection  Nose: Nares bilaterally patent, no discharge, no epistaxis, nasal intubation in left nare  Mouth: Blood tinged saliva, wires in place  Neck: Dressing clean and dry, penrose in place, no drainage from wound, submandibular space/level Ia soft, no erythema  Resp: no stridor  CV: no peripheral edema/cyanosis    Laryngoscopy: clear nasopharynx to glottis, ETT passing through glottis, mild supraglottic erythema    LABS:                        12.2   12.0  )-----------( 181      ( 02 Nov 2017 05:30 )             35.3           Assessment and Plan:   36y POD #2 s/p I&D for submandibular abscess and jaw wired shut to correct left displaced ramus fracture, improving     Problem/Plan - 1:  ·  Problem: Submandibular abscess.    -penrose drain removed  -guaze dressing to neck, change qday  -ok to wean to extubate from ENT standpoint     Problem/Plan - 2:  ·  Problem: Mandible fracture  -MMF x 6 weeks  -clear liquid diet once extubated Progress Note:   · Provider Specialty	ENT	      · Subjective and Objective: 	  POD/STATUS POST/ENT ISSUE: POD #2 s/p I&D of sublingual abscess and MMF    INTERVAL HPI: 37 yo male history of ludwigs angina s/p fall resulting in left side mandible fracture POD #1 for I&D sublingual abcess and MMF. Unable to converse w pt due to intubation and MMF. Pt resting in PACU, gf at bedside. Pain well-controlled.    Vital Signs Last 24 Hrs  T(C): 36.8 (02 Nov 2017 23:00), Max: 37.1 (02 Nov 2017 01:00)  T(F): 98.3 (02 Nov 2017 23:00), Max: 98.8 (02 Nov 2017 01:00)  HR: 50 (02 Nov 2017 23:00) (44 - 58)  BP: 95/51 (02 Nov 2017 22:00) (95/51 - 119/68)  BP(mean): 69 (02 Nov 2017 22:00) (68 - 89)  RR: 14 (02 Nov 2017 23:00) (12 - 33)  SpO2: 100% (02 Nov 2017 23:00) (100% - 100%)    PHYSICAL EXAM:  Gen: NAD, well-developed  Head: Normocephalic, Atraumatic  Eyes: PERRL, EOMI, no scleral injection  Nose: Nares bilaterally patent, no discharge, no epistaxis, nasal intubation in left nare  Mouth: Blood tinged saliva, wires in place  Neck: Dressing clean and dry, penrose in place, no drainage from wound, submandibular space/level Ia soft, no erythema  Resp: no stridor  CV: no peripheral edema/cyanosis    Laryngoscopy: clear nasopharynx to glottis, ETT passing through glottis, mild supraglottic erythema    LABS:                        12.2   12.0  )-----------( 181      ( 02 Nov 2017 05:30 )             35.3           Assessment and Plan:   36y POD #2 s/p I&D for submandibular abscess and jaw wired shut to correct left displaced ramus fracture, improving     Problem/Plan - 1:  ·  Problem: Submandibular abscess.    -penrose drain removed  -guaze dressing to neck, change qday  -ok to wean to extubate from ENT standpoint     Problem/Plan - 2:  ·  Problem: Mandible fracture  -MMF x 6 weeks  -clear liquid diet once extubated

## 2017-11-03 ENCOUNTER — TRANSCRIPTION ENCOUNTER (OUTPATIENT)
Age: 37
End: 2017-11-03

## 2017-11-03 LAB
ANION GAP SERPL CALC-SCNC: 10 MMOL/L — SIGNIFICANT CHANGE UP (ref 5–17)
BUN SERPL-MCNC: 19 MG/DL — SIGNIFICANT CHANGE UP (ref 7–23)
CALCIUM SERPL-MCNC: 7.5 MG/DL — LOW (ref 8.4–10.5)
CHLORIDE SERPL-SCNC: 109 MMOL/L — HIGH (ref 96–108)
CO2 SERPL-SCNC: 22 MMOL/L — SIGNIFICANT CHANGE UP (ref 22–31)
CREAT SERPL-MCNC: 0.55 MG/DL — SIGNIFICANT CHANGE UP (ref 0.5–1.3)
CULTURE RESULTS: SIGNIFICANT CHANGE UP
CULTURE RESULTS: SIGNIFICANT CHANGE UP
GAS PNL BLDA: SIGNIFICANT CHANGE UP
GLUCOSE BLDC GLUCOMTR-MCNC: 102 MG/DL — HIGH (ref 70–99)
GLUCOSE BLDC GLUCOMTR-MCNC: 98 MG/DL — SIGNIFICANT CHANGE UP (ref 70–99)
GLUCOSE SERPL-MCNC: 104 MG/DL — HIGH (ref 70–99)
HCT VFR BLD CALC: 29.1 % — LOW (ref 39–50)
HGB BLD-MCNC: 9.5 G/DL — LOW (ref 13–17)
MAGNESIUM SERPL-MCNC: 2 MG/DL — SIGNIFICANT CHANGE UP (ref 1.6–2.6)
MCHC RBC-ENTMCNC: 30.9 PG — SIGNIFICANT CHANGE UP (ref 27–34)
MCHC RBC-ENTMCNC: 32.8 GM/DL — SIGNIFICANT CHANGE UP (ref 32–36)
MCV RBC AUTO: 94 FL — SIGNIFICANT CHANGE UP (ref 80–100)
ORGANISM # SPEC MICROSCOPIC CNT: SIGNIFICANT CHANGE UP
ORGANISM # SPEC MICROSCOPIC CNT: SIGNIFICANT CHANGE UP
PHOSPHATE SERPL-MCNC: 3.2 MG/DL — SIGNIFICANT CHANGE UP (ref 2.5–4.5)
PLATELET # BLD AUTO: 167 K/UL — SIGNIFICANT CHANGE UP (ref 150–400)
POTASSIUM SERPL-MCNC: 4 MMOL/L — SIGNIFICANT CHANGE UP (ref 3.5–5.3)
POTASSIUM SERPL-SCNC: 4 MMOL/L — SIGNIFICANT CHANGE UP (ref 3.5–5.3)
RBC # BLD: 3.09 M/UL — LOW (ref 4.2–5.8)
RBC # FLD: 12.5 % — SIGNIFICANT CHANGE UP (ref 10.3–14.5)
SODIUM SERPL-SCNC: 141 MMOL/L — SIGNIFICANT CHANGE UP (ref 135–145)
SPECIMEN SOURCE: SIGNIFICANT CHANGE UP
SPECIMEN SOURCE: SIGNIFICANT CHANGE UP
WBC # BLD: 12.1 K/UL — HIGH (ref 3.8–10.5)
WBC # FLD AUTO: 12.1 K/UL — HIGH (ref 3.8–10.5)

## 2017-11-03 PROCEDURE — 99291 CRITICAL CARE FIRST HOUR: CPT

## 2017-11-03 PROCEDURE — 71010: CPT | Mod: 26

## 2017-11-03 RX ORDER — ACETAMINOPHEN 500 MG
1000 TABLET ORAL ONCE
Qty: 0 | Refills: 0 | Status: COMPLETED | OUTPATIENT
Start: 2017-11-03 | End: 2017-11-03

## 2017-11-03 RX ORDER — SODIUM CHLORIDE 9 MG/ML
500 INJECTION, SOLUTION INTRAVENOUS
Qty: 0 | Refills: 0 | Status: DISCONTINUED | OUTPATIENT
Start: 2017-11-03 | End: 2017-11-04

## 2017-11-03 RX ORDER — DEXAMETHASONE 0.5 MG/5ML
10 ELIXIR ORAL EVERY 8 HOURS
Qty: 0 | Refills: 0 | Status: DISCONTINUED | OUTPATIENT
Start: 2017-11-03 | End: 2017-11-04

## 2017-11-03 RX ADMIN — Medication 400 MILLIGRAM(S): at 14:44

## 2017-11-03 RX ADMIN — Medication 1000 MILLIGRAM(S): at 21:00

## 2017-11-03 RX ADMIN — AMPICILLIN SODIUM AND SULBACTAM SODIUM 200 GRAM(S): 250; 125 INJECTION, POWDER, FOR SUSPENSION INTRAMUSCULAR; INTRAVENOUS at 12:34

## 2017-11-03 RX ADMIN — SODIUM CHLORIDE 100 MILLILITER(S): 9 INJECTION, SOLUTION INTRAVENOUS at 21:55

## 2017-11-03 RX ADMIN — Medication 102 MILLIGRAM(S): at 21:55

## 2017-11-03 RX ADMIN — FENTANYL CITRATE 4.08 MICROGRAM(S)/KG/HR: 50 INJECTION INTRAVENOUS at 07:49

## 2017-11-03 RX ADMIN — SODIUM CHLORIDE 100 MILLILITER(S): 9 INJECTION, SOLUTION INTRAVENOUS at 07:49

## 2017-11-03 RX ADMIN — Medication 400 MILLIGRAM(S): at 20:33

## 2017-11-03 RX ADMIN — SODIUM CHLORIDE 100 MILLILITER(S): 9 INJECTION, SOLUTION INTRAVENOUS at 18:20

## 2017-11-03 RX ADMIN — AMPICILLIN SODIUM AND SULBACTAM SODIUM 200 GRAM(S): 250; 125 INJECTION, POWDER, FOR SUSPENSION INTRAMUSCULAR; INTRAVENOUS at 00:31

## 2017-11-03 RX ADMIN — SODIUM CHLORIDE 100 MILLILITER(S): 9 INJECTION, SOLUTION INTRAVENOUS at 06:35

## 2017-11-03 RX ADMIN — AMPICILLIN SODIUM AND SULBACTAM SODIUM 200 GRAM(S): 250; 125 INJECTION, POWDER, FOR SUSPENSION INTRAMUSCULAR; INTRAVENOUS at 18:20

## 2017-11-03 RX ADMIN — AMPICILLIN SODIUM AND SULBACTAM SODIUM 200 GRAM(S): 250; 125 INJECTION, POWDER, FOR SUSPENSION INTRAMUSCULAR; INTRAVENOUS at 07:47

## 2017-11-03 RX ADMIN — PROPOFOL 2.45 MICROGRAM(S)/KG/MIN: 10 INJECTION, EMULSION INTRAVENOUS at 00:28

## 2017-11-03 RX ADMIN — ENOXAPARIN SODIUM 40 MILLIGRAM(S): 100 INJECTION SUBCUTANEOUS at 20:33

## 2017-11-03 RX ADMIN — CHLORHEXIDINE GLUCONATE 15 MILLILITER(S): 213 SOLUTION TOPICAL at 06:34

## 2017-11-03 RX ADMIN — Medication 1000 MILLIGRAM(S): at 14:59

## 2017-11-03 NOTE — AIRWAY REMOVAL NOTE  ADULT & PEDS - ARTIFICAL AIRWAY REMOVAL COMMENTS
Written order for extubation verified.The patient was identified by full name and birth date compared to the identification band. Present during the procedure was Kyra Winters RN

## 2017-11-03 NOTE — DIETITIAN INITIAL EVALUATION ADULT. - ENERGY NEEDS
ht: 5 feet 11 inches, wt: 180 pounds, BMI: 25.1 Kg/m2, IBW: 172pounds (+/- 10%), 105% IBW. Edema: none noted; Skin: no pressure injuries.

## 2017-11-03 NOTE — DIETITIAN INITIAL EVALUATION ADULT. - PT NOT SOURCE
other (specify)/Pt with jaw wired shut; interview with family is pending Pt with jaw wired shut; interview conducted with mother at bedside/other (specify)

## 2017-11-03 NOTE — DIETITIAN INITIAL EVALUATION ADULT. - OTHER INFO
Nutrition Assessment warranted for length of stay in SICU. Pt is a 37 yo male with no significant PMHx presenting with a left mandibular fracture and sublingual gland infection S/P I&D and wiring of the mandible for the fracture.

## 2017-11-03 NOTE — DIETITIAN INITIAL EVALUATION ADULT. - NS AS NUTRI INTERV MEDICAL AND FOOD SUPPLEMENTS
advance to Ensure Enlive (provides 350cal, 20Gm protein per 8oz serving) tid as tolerated/Commercial beverage

## 2017-11-03 NOTE — PROGRESS NOTE ADULT - SUBJECTIVE AND OBJECTIVE BOX
HISTORY  35 y/o male with no significant PMHx s/p fall on Sunday after tripping at home and hitting the left side of face on table. Patient presented to [facility] and was found to have left sided "jaw fracture and impacted wisdom tooth". He was told to follow up outpatient and was not prescribed antibiotics. For past 2 days, patient began spiking fevers with progressively worsening left sided facial swelling and redness extending to the neck. Unable to open jaw due to pain and swelling, causing him to be NPO. CT scan demonstrated Santana's angina with a left mandibular fracture so he was taken to the OR. Patient is s/p I&D of mandibular abscess and wiring of the mandible for the fracture. He was nasotracheally intubated and will remain intubated for approx. 2 days per ENT so SICU was consulted. ROS not obtained as patient is intubated & sedated.    24 HOUR EVENTS: penrose drain removed by ENT, no acute issues overnight.     SUBJECTIVE/ROS:  [ ] A ten-point review of systems was otherwise negative except as noted.  [ ] Due to altered mental status/intubation, subjective information were not able to be obtained from the patient. History was obtained, to the extent possible, from review of the chart and collateral sources of information.      NEURO  RASS: -2  Exam: sedated, easily arousable but falls back asleep unless constantly stimulated, follows commands, NAD  Meds: fentaNYL   Infusion 1 MICROgram(s)/kG/Hr IV Continuous <Continuous>  propofol Infusion 5 MICROgram(s)/kG/Min IV Continuous <Continuous>    [x] Adequacy of sedation and pain control has been assessed and adjusted      RESPIRATORY  RR: 14 (11-03-17 @ 04:00) (12 - 33)  SpO2: 100% (11-03-17 @ 04:33) (100% - 100%)  Wt(kg): --  Exam: unlabored, clear to auscultation bilaterally  Mechanical Ventilation: Mode: AC/ CMV (Assist Control/ Continuous Mandatory Ventilation), RR (machine): 14, RR (patient): 14, TV (machine): 500, FiO2: 40, PEEP: 5, ITime: 1, MAP: 9, PIP: 21  ABG - ( 02 Nov 2017 05:35 )  pH: 7.46  /  pCO2: 37    /  pO2: 177   / HCO3: 26    / Base Excess: 2.6   /  SaO2: 100     Lactate: x                [ ] Extubation Readiness Assessed  Meds:       CARDIOVASCULAR  HR: 46 (11-03-17 @ 04:33) (44 - 59)  BP: 95/54 (11-03-17 @ 04:00) (93/50 - 119/68)  BP(mean): 70 (11-03-17 @ 04:00) (66 - 89)  ABP: --  ABP(mean): --  Wt(kg): --  CVP(cm H2O): --      Exam:  Cardiac Rhythm: sinus bradycardia  Perfusion     [ ]Adequate   [ ]Inadequate  Mentation   [ ]Normal       [ ]Reduced  Extremities  [ ]Warm         [ ]Cool  Volume Status [ ]Hypervolemic [ ]Euvolemic [ ]Hypovolemic  Meds:       GI/NUTRITION  Exam: soft, nontender, nondistended  Diet: NPO  Meds: pantoprazole  Injectable 40 milliGRAM(s) IV Push every 24 hours      GENITOURINARY  I&O's Detail    11-01 @ 07:01  -  11-02 @ 07:00  --------------------------------------------------------  IN:    dexmedetomidine Infusion: 10.3 mL    fentaNYL  Infusion: 69.7 mL    IV PiggyBack: 50 mL    Lactated Ringers IV Bolus: 1000 mL    lactated ringers.: 1750 mL    propofol Infusion: 398.4 mL  Total IN: 3278.4 mL    OUT:    Indwelling Catheter - Urethral: 2255 mL  Total OUT: 2255 mL    Total NET: 1023.4 mL      11-02 @ 07:01  -  11-03 @ 05:38  --------------------------------------------------------  IN:    fentaNYL  Infusion: 86.4 mL    IV PiggyBack: 100 mL    lactated ringers.: 1750 mL    propofol Infusion: 404 mL  Total IN: 2340.4 mL    OUT:    Indwelling Catheter - Urethral: 810 mL  Total OUT: 810 mL    Total NET: 1530.4 mL          11-03    141  |  109<H>  |  19  ----------------------------<  104<H>  4.0   |  22  |  0.55    Ca    7.5<L>      03 Nov 2017 04:16  Phos  2.6     11-02  Mg     2.2     11-02      [ ] Ignacio catheter, indication: N/A  Meds: lactated ringers. 1000 milliLiter(s) IV Continuous <Continuous>        HEMATOLOGIC  Meds: enoxaparin Injectable 40 milliGRAM(s) SubCutaneous every 24 hours    [x] VTE Prophylaxis                        9.5    12.1  )-----------( 167      ( 03 Nov 2017 04:16 )             29.1       Transfusion     [ ] PRBC   [ ] Platelets   [ ] FFP   [ ] Cryoprecipitate      INFECTIOUS DISEASES  T(C): 36.3 (11-03-17 @ 03:00), Max: 37 (11-02-17 @ 07:30)  Wt(kg): --  WBC Count: 12.1 K/uL (11-03 @ 04:16)    Recent Cultures:  Specimen Source: Skin right nck abscess, 11-01 @ 17:23; Results   No growth to date.; Gram Stain: --; Organism: --  Specimen Source: .Surgical Swab right neck abscess, 11-01 @ 16:35; Results   Culture in progress; Gram Stain: --; Organism: --  Specimen Source: .Blood Blood, 11-01 @ 00:25; Results   Growth in aerobic bottle: Gram Positive Cocci in Clusters  ***Blood Panel PCR results on this specimen are available  approximately 3 hours after the Gram stain result.***  Gram stain, PCR, and/or culture results may not always  correspond due to difference in methodologies.  ************************************************************  This PCR assay was performed using [employer].  The following targets are tested for: Enterococcus,  vancomycin resistant enterococci, Listeria monocytogenes,  coagulase negative staphylococci, S. aureus,  methicillin resistant S. aureus, Streptococcus agalactiae  (Group B), S. pneumoniae, S. pyogenes (Group A),  Acinetobacter baumannii, Enterobacter cloacae, E. coli,  Klebsiella oxytoca, K. pneumoniae, Proteus sp.,  Serratia marcescens, Haemophilus influenzae,  Neisseria meningitidis, Pseudomonas aeruginosa, Candida  albicans, C. glabrata, C krusei, C parapsilosis,  C. tropicalis and the KPC resistance gene.; Gram Stain:   Growth in aerobic bottle: Gram Positive Cocci in Clusters; Organism: Blood Culture PCR    Meds: ampicillin/sulbactam  IVPB      ampicillin/sulbactam  IVPB 3 Gram(s) IV Intermittent every 6 hours        ENDOCRINE  Capillary Blood Glucose  102 (03 Nov 2017 04:00)  108 (02 Nov 2017 22:00)    Meds:       ACCESS DEVICES:  [ ] Peripheral IV  [ ] Central Venous Line	[ ] R	[ ] L	[ ] IJ	[ ] Fem	[ ] SC	Placed:   [ ] Arterial Line		[ ] R	[ ] L	[ ] Fem	[ ] Rad	[ ] Ax	Placed:   [ ] PICC:					[ ] Mediport  [ ] Urinary Catheter, Date Placed:   [ ] Necessity of urinary, arterial, and venous catheters discussed    OTHER MEDICATIONS:  artificial  tears Solution 1 Drop(s) Both EYES every 8 hours PRN  chlorhexidine 0.12% Liquid 15 milliLiter(s) Swish and Spit three times a day      CODE STATUS:     IMAGING:

## 2017-11-03 NOTE — DIETITIAN INITIAL EVALUATION ADULT. - NS AS NUTRI INTERV ED CONTENT
Reviewed clear liquid diet with pt's mother, reviewed strategies for optimizing intake if pt is discharged on a full liquid diet./Other (specify)

## 2017-11-03 NOTE — DIETITIAN INITIAL EVALUATION ADULT. - ORAL INTAKE PTA
Unable to assess. Per chart review, pt takes no nutrition supplements, reports NKFA. Per mother, pt is a "big eater" and very active with no nutrition issues PTA. Per chart review, pt takes no nutrition supplements, reports NKFA.

## 2017-11-03 NOTE — DIETITIAN INITIAL EVALUATION ADULT. - FACTORS AFF FOOD INTAKE
difficulty chewing/Pt has been NPO since admit; extubated and diet advanced to clear liquids today; jaw is wired shut difficulty chewing/Pt has been NPO since admit; extubated and diet advanced to clear liquids today; jaw is wired shut. Pt observed eating small spoons of Italian ice and sipping liquids through a straw with good tolerance.

## 2017-11-03 NOTE — DIETITIAN INITIAL EVALUATION ADULT. - NUTRITION INTERVENTION
Meals and Snack/Medical Food Supplements Meals and Snack/Medical Food Supplements/Nutrition Education

## 2017-11-03 NOTE — DIETITIAN INITIAL EVALUATION ADULT. - SOURCE
family/significant other/medical record, team rounds, RD/other (specify) family/significant other/other (specify)/medical record, team rounds, mother

## 2017-11-03 NOTE — PROGRESS NOTE ADULT - ATTENDING COMMENTS
Patient seen and examined and agree with above.   36 year old male with Ludwigs angina s/p incision and drainage of mandibular abscess and wiring of mandible. Patient nasotracheally intubated. Patient is ventilating and oxygenating well. Plan from ENT is to keep the NTT in for 2-3 days.     Improving neutrophilic leukocytosis. Continue antibiotics clindamycin.  Patient afebrile. Will start unasyn as well.   GPC bacteremia in one set of cultures with PCR not demonstrating an organism.  Will repeat blood cultures.   Ignacio had hematuria likely secondary to traumatic insertion.  Will continue to monitor urine output.    CC time: 38 minutes
Patient seen and examined and agree with above.   36 year old male with Ludwigs angina s/p incision and drainage of mandibular abscess and wiring of mandible. Patient nasotracheally intubated. He met criteria this morning for extubation. Patient is ventilating and oxygenating well. Continue to keep SpO2> 92 %    Improving neutrophilic leukocytosis. Continue antibiotics clindamycin.  Patient afebrile. Will start unasyn as well.   GPC bacteremia in one set of cultures with PCR not demonstrating an organism.  Will repeat blood cultures.     Discontinue magana.   patient to be advanced to CLD  He is to get out of bed and ambulate.   CC time: 32 minutes

## 2017-11-03 NOTE — PROGRESS NOTE ADULT - ASSESSMENT
37 y/o male with no significant PMHx presenting with a left mandibular fracture and sublingual gland infection s/p I&D and wiring of the mandible for the fracture. He was nasotracheally intubated and will remain intubated for approx. 2 days per ENT.    PLAN:  Neuro: acute post-op pain, sedation in the setting of intubation  - Monitor mental status.  - Propofol for sedation in the setting of intubation.  - Fentanyl for analgesic sedation.    Resp: nasotracheal intubation for mandibular I&D and wiring of mandible  - Monitor pulse oximeter & ABGs.  - NT intubation, okay to extubate as per ENT.     CV: no acute issues  - Monitor vital signs.    GI: mandible wired shut  - Advance NPO to CLD once extubated  - Zofran and Reglan PRN nausea.  - Protonix for GERD.    Renal: hematuria likely secondary to traumatic Ignacio insertion  - Monitor I&Os.  - Monitor electrolytes and replete as necessary.  - Monitor hematuria. Flush Ignacio as needed if low UOP decreases.  - LR @ 100 cc/hr as patient is NPO.    Heme: hematuria  - Monitor CBCs.  - Lovenox for VTE prophylaxis.    ID: Santana's angina  - Monitor WBC and temperature.  - Follow up cultures.  - Clindamycin for empiric coverage of Santana's angina.    Endo: no acute issues  - Monitor glucose on BMP.    Disposition:  - Will remain in SICU. Possible plan to transfer to [facility] SICU for more comprehensive ENT care.  - Full code.

## 2017-11-04 ENCOUNTER — TRANSCRIPTION ENCOUNTER (OUTPATIENT)
Age: 37
End: 2017-11-04

## 2017-11-04 LAB
-  AMPICILLIN/SULBACTAM: SIGNIFICANT CHANGE UP
-  CEFAZOLIN: SIGNIFICANT CHANGE UP
-  CIPROFLOXACIN: SIGNIFICANT CHANGE UP
-  CLINDAMYCIN: SIGNIFICANT CHANGE UP
-  ERYTHROMYCIN: SIGNIFICANT CHANGE UP
-  GENTAMICIN: SIGNIFICANT CHANGE UP
-  LEVOFLOXACIN: SIGNIFICANT CHANGE UP
-  MOXIFLOXACIN(AEROBIC): SIGNIFICANT CHANGE UP
-  OXACILLIN: SIGNIFICANT CHANGE UP
-  PENICILLIN: SIGNIFICANT CHANGE UP
-  RIFAMPIN: SIGNIFICANT CHANGE UP
-  TETRACYCLINE: SIGNIFICANT CHANGE UP
-  TRIMETHOPRIM/SULFAMETHOXAZOLE: SIGNIFICANT CHANGE UP
-  VANCOMYCIN: SIGNIFICANT CHANGE UP
ANION GAP SERPL CALC-SCNC: 14 MMOL/L — SIGNIFICANT CHANGE UP (ref 5–17)
BUN SERPL-MCNC: 13 MG/DL — SIGNIFICANT CHANGE UP (ref 7–23)
CA-I BLD-SCNC: 1.16 MMOL/L — SIGNIFICANT CHANGE UP (ref 1.12–1.3)
CALCIUM SERPL-MCNC: 8.7 MG/DL — SIGNIFICANT CHANGE UP (ref 8.4–10.5)
CHLORIDE SERPL-SCNC: 101 MMOL/L — SIGNIFICANT CHANGE UP (ref 96–108)
CO2 SERPL-SCNC: 26 MMOL/L — SIGNIFICANT CHANGE UP (ref 22–31)
CREAT SERPL-MCNC: 0.7 MG/DL — SIGNIFICANT CHANGE UP (ref 0.5–1.3)
GLUCOSE SERPL-MCNC: 175 MG/DL — HIGH (ref 70–99)
HCT VFR BLD CALC: 35.6 % — LOW (ref 39–50)
HGB BLD-MCNC: 11.8 G/DL — LOW (ref 13–17)
MAGNESIUM SERPL-MCNC: 2 MG/DL — SIGNIFICANT CHANGE UP (ref 1.6–2.6)
MCHC RBC-ENTMCNC: 30.8 PG — SIGNIFICANT CHANGE UP (ref 27–34)
MCHC RBC-ENTMCNC: 33.2 GM/DL — SIGNIFICANT CHANGE UP (ref 32–36)
MCV RBC AUTO: 92.6 FL — SIGNIFICANT CHANGE UP (ref 80–100)
METHOD TYPE: SIGNIFICANT CHANGE UP
PHOSPHATE SERPL-MCNC: 3.3 MG/DL — SIGNIFICANT CHANGE UP (ref 2.5–4.5)
PLATELET # BLD AUTO: 221 K/UL — SIGNIFICANT CHANGE UP (ref 150–400)
POTASSIUM SERPL-MCNC: 4.1 MMOL/L — SIGNIFICANT CHANGE UP (ref 3.5–5.3)
POTASSIUM SERPL-SCNC: 4.1 MMOL/L — SIGNIFICANT CHANGE UP (ref 3.5–5.3)
RBC # BLD: 3.84 M/UL — LOW (ref 4.2–5.8)
RBC # FLD: 12.7 % — SIGNIFICANT CHANGE UP (ref 10.3–14.5)
SODIUM SERPL-SCNC: 141 MMOL/L — SIGNIFICANT CHANGE UP (ref 135–145)
WBC # BLD: 11.4 K/UL — HIGH (ref 3.8–10.5)
WBC # FLD AUTO: 11.4 K/UL — HIGH (ref 3.8–10.5)

## 2017-11-04 PROCEDURE — 71010: CPT | Mod: 26

## 2017-11-04 RX ORDER — PIPERACILLIN AND TAZOBACTAM 4; .5 G/20ML; G/20ML
3.38 INJECTION, POWDER, LYOPHILIZED, FOR SOLUTION INTRAVENOUS EVERY 8 HOURS
Qty: 0 | Refills: 0 | Status: DISCONTINUED | OUTPATIENT
Start: 2017-11-04 | End: 2017-11-04

## 2017-11-04 RX ORDER — ACETAMINOPHEN 500 MG
975 TABLET ORAL EVERY 6 HOURS
Qty: 0 | Refills: 0 | Status: DISCONTINUED | OUTPATIENT
Start: 2017-11-04 | End: 2017-11-06

## 2017-11-04 RX ORDER — ACETAMINOPHEN 500 MG
1000 TABLET ORAL ONCE
Qty: 0 | Refills: 0 | Status: COMPLETED | OUTPATIENT
Start: 2017-11-04 | End: 2017-11-04

## 2017-11-04 RX ORDER — ACETAMINOPHEN 500 MG
32 TABLET ORAL
Qty: 160 | Refills: 0 | OUTPATIENT
Start: 2017-11-04

## 2017-11-04 RX ORDER — SODIUM CHLORIDE 9 MG/ML
500 INJECTION, SOLUTION INTRAVENOUS
Qty: 0 | Refills: 0 | Status: DISCONTINUED | OUTPATIENT
Start: 2017-11-04 | End: 2017-11-06

## 2017-11-04 RX ORDER — INFLUENZA VIRUS VACCINE 15; 15; 15; 15 UG/.5ML; UG/.5ML; UG/.5ML; UG/.5ML
0.5 SUSPENSION INTRAMUSCULAR ONCE
Qty: 0 | Refills: 0 | Status: DISCONTINUED | OUTPATIENT
Start: 2017-11-04 | End: 2017-11-06

## 2017-11-04 RX ORDER — IBUPROFEN 200 MG
1 TABLET ORAL
Qty: 126 | Refills: 0 | OUTPATIENT
Start: 2017-11-04 | End: 2017-11-25

## 2017-11-04 RX ORDER — MORPHINE SULFATE 50 MG/1
2 CAPSULE, EXTENDED RELEASE ORAL EVERY 4 HOURS
Qty: 0 | Refills: 0 | Status: DISCONTINUED | OUTPATIENT
Start: 2017-11-04 | End: 2017-11-06

## 2017-11-04 RX ORDER — MORPHINE SULFATE 50 MG/1
30 CAPSULE, EXTENDED RELEASE ORAL
Qty: 40 | Refills: 0 | OUTPATIENT
Start: 2017-11-04 | End: 2017-11-14

## 2017-11-04 RX ORDER — MORPHINE SULFATE 50 MG/1
1 CAPSULE, EXTENDED RELEASE ORAL EVERY 4 HOURS
Qty: 0 | Refills: 0 | Status: DISCONTINUED | OUTPATIENT
Start: 2017-11-04 | End: 2017-11-06

## 2017-11-04 RX ADMIN — ENOXAPARIN SODIUM 40 MILLIGRAM(S): 100 INJECTION SUBCUTANEOUS at 20:21

## 2017-11-04 RX ADMIN — SODIUM CHLORIDE 50 MILLILITER(S): 9 INJECTION, SOLUTION INTRAVENOUS at 10:30

## 2017-11-04 RX ADMIN — AMPICILLIN SODIUM AND SULBACTAM SODIUM 200 GRAM(S): 250; 125 INJECTION, POWDER, FOR SUSPENSION INTRAMUSCULAR; INTRAVENOUS at 00:19

## 2017-11-04 RX ADMIN — Medication 102 MILLIGRAM(S): at 05:12

## 2017-11-04 RX ADMIN — SODIUM CHLORIDE 100 MILLILITER(S): 9 INJECTION, SOLUTION INTRAVENOUS at 03:36

## 2017-11-04 RX ADMIN — AMPICILLIN SODIUM AND SULBACTAM SODIUM 200 GRAM(S): 250; 125 INJECTION, POWDER, FOR SUSPENSION INTRAMUSCULAR; INTRAVENOUS at 05:38

## 2017-11-04 RX ADMIN — Medication 1000 MILLIGRAM(S): at 12:47

## 2017-11-04 RX ADMIN — AMPICILLIN SODIUM AND SULBACTAM SODIUM 200 GRAM(S): 250; 125 INJECTION, POWDER, FOR SUSPENSION INTRAMUSCULAR; INTRAVENOUS at 17:47

## 2017-11-04 RX ADMIN — AMPICILLIN SODIUM AND SULBACTAM SODIUM 200 GRAM(S): 250; 125 INJECTION, POWDER, FOR SUSPENSION INTRAMUSCULAR; INTRAVENOUS at 23:13

## 2017-11-04 RX ADMIN — AMPICILLIN SODIUM AND SULBACTAM SODIUM 200 GRAM(S): 250; 125 INJECTION, POWDER, FOR SUSPENSION INTRAMUSCULAR; INTRAVENOUS at 11:11

## 2017-11-04 RX ADMIN — PIPERACILLIN AND TAZOBACTAM 25 GRAM(S): 4; .5 INJECTION, POWDER, LYOPHILIZED, FOR SOLUTION INTRAVENOUS at 03:35

## 2017-11-04 RX ADMIN — Medication 400 MILLIGRAM(S): at 12:29

## 2017-11-04 NOTE — PROGRESS NOTE ADULT - ASSESSMENT
37 y/o male with no significant PMHx presenting with a left mandibular fracture and sublingual gland infection s/p I&D and wiring of the mandible for the fracture. He was nasotracheally intubated and will remain intubated for approx. 2 days per ENT.    PLAN:  Neuro: acute post-op pain, sedation in the setting of intubation  - Monitor mental status.  - Propofol for sedation in the setting of intubation.  - Fentanyl for analgesic sedation.    Resp: nasotracheal intubation for mandibular I&D and wiring of mandible  - Monitor pulse oximeter & ABGs.  - NT intubation, okay to extubate as per ENT.     CV: no acute issues  - Monitor vital signs.    GI: mandible wired shut  - As per ENT, can advance to [facility] again.   - Zofran and Reglan PRN nausea.  - Protonix for GERD.    Renal: hematuria likely secondary to traumatic Ignacio insertion  - Monitor I&Os.  - Monitor electrolytes and replete as necessary.  - Monitor hematuria. Flush Ignacio as needed if low UOP decreases.  - D5 1/2NS @ 100/hr    Heme: hematuria  - Monitor CBCs.  - Lovenox for VTE prophylaxis.    ID: Santana's angina  - Monitor WBC and temperature.  - Follow up cultures.  - Unaysn and Zosyn for gram negative coverage of Santana's angina.    Endo: no acute issues  - Monitor glucose on BMP.    Disposition:  - Will remain in SICU. Possible plan to transfer to [facility] SICU for more comprehensive ENT care.  - Full code.

## 2017-11-04 NOTE — DISCHARGE NOTE ADULT - NS AS ACTIVITY OBS
Walking-Outdoors allowed/Walking-Indoors allowed/Return to Work/School allowed Walking-Outdoors allowed/Return to Work/School allowed/Showering allowed/No Heavy lifting/straining/Walking-Indoors allowed

## 2017-11-04 NOTE — PROGRESS NOTE ADULT - SUBJECTIVE AND OBJECTIVE BOX
· Subjective and Objective: 	  POD/STATUS POST/ENT ISSUE: POD #3 s/p I&D of sublingual abscess and MMF    S: no c/o's    Vital Signs Last 24 Hrs  T(C): 37.3 (03 Nov 2017 23:00), Max: 37.6 (03 Nov 2017 15:00)  T(F): 99.1 (03 Nov 2017 23:00), Max: 99.7 (03 Nov 2017 15:00)  HR: 68 (04 Nov 2017 00:00) (46 - 94)  BP: 118/64 (04 Nov 2017 00:00) (93/50 - 135/77)  BP(mean): 83 (04 Nov 2017 00:00) (66 - 102)  RR: 20 (04 Nov 2017 00:00) (14 - 43)  SpO2: 94% (04 Nov 2017 00:00) (94% - 100%)    PHYSICAL EXAM:  Gen: NAD, well-developed, no stridor/drooling/respiratory distress  Head: Normocephalic, Atraumatic  Eyes: PERRL, EOMI, no scleral injection  Nose: Nares bilaterally patent, no discharge, no epistaxis,   Mouth: Blood tinged saliva, wires in place  Neck: Dressing clean and dry, slight increase in submandibular space/level Ia edema o/n but stable since the morning, no erythema/ttp/or fluctuance  Resp: no stridor  CV: no peripheral edema/cyanosis      LABS:                        12.2   12.0  )-----------( 181      ( 02 Nov 2017 05:30 )             35.3           Assessment and Plan:   36y POD #3 s/p I&D for submandibular abscess and jaw wired shut to correct left displaced ramus fracture, improving     Problem/Plan - 1:  ·  Problem: Submandibular abscess.  s/p I&D. increase in submandibular edema s/p penrose removal, probable postop changes, stable since AM  -cont Unasyn  -add Zosyn  -decadron 10mg IV q8hrs x 3 doses  -ok to transfer to floor from ENT standpoint  -clear liquid diet     Problem/Plan - 2:  ·  Problem: Mandible fracture  -MMF x 6 weeks  -clear liquid diet once extubated

## 2017-11-04 NOTE — PROGRESS NOTE ADULT - SUBJECTIVE AND OBJECTIVE BOX
POD/STATUS POST/ENT ISSUE:     INTERVAL HPI:     Vital Signs Last 24 Hrs  T(C): 36.7 (04 Nov 2017 11:00), Max: 37.6 (03 Nov 2017 15:00)  T(F): 98 (04 Nov 2017 11:00), Max: 99.7 (03 Nov 2017 15:00)  HR: 61 (04 Nov 2017 11:00) (55 - 94)  BP: 131/89 (04 Nov 2017 11:00) (105/58 - 135/77)  BP(mean): 104 (04 Nov 2017 11:00) (77 - 104)  RR: 22 (04 Nov 2017 11:00) (16 - 43)  SpO2: 97% (04 Nov 2017 11:00) (94% - 100%)    PHYSICAL EXAM:  Gen: NAD, well-developed  Head: Normocephalic, Atraumatic  Eyes: PERRL, EOMI, no scleral injection    Nose: Nares bilaterally patent, no discharge  Mouth: limited view due to MMF left submandibular swelling no obvious drainage or bleeding Mucosa moist,   Neck: s/p I&D +anterior neck edema, improving no overlying erythema or induration no obvious drainage or fluctuance from site, trachea midline, no masses  Resp: breathing easily, no stridor  CV: no peripheral edema/cyanosis    LABS:                        11.8   11.4  )-----------( 221      ( 04 Nov 2017 03:42 )             35.6 POD/STATUS POST/ENT ISSUE: POD #3 s/p I&D of sublingual abscess and MMF    INTERVAL HPI: 35 yo male history of ludwigs angina s/p fall resulting in left side mandible fracture POD #3 for I&D sublingual abscess and MMF. Unable to converse w pt due to intubation and MMF. Pt resting in PACU, gf at bedside. Pain well-controlled.    Vital Signs Last 24 Hrs  T(C): 36.7 (04 Nov 2017 11:00), Max: 37.6 (03 Nov 2017 15:00)  T(F): 98 (04 Nov 2017 11:00), Max: 99.7 (03 Nov 2017 15:00)  HR: 61 (04 Nov 2017 11:00) (55 - 94)  BP: 131/89 (04 Nov 2017 11:00) (105/58 - 135/77)  BP(mean): 104 (04 Nov 2017 11:00) (77 - 104)  RR: 22 (04 Nov 2017 11:00) (16 - 43)  SpO2: 97% (04 Nov 2017 11:00) (94% - 100%)    PHYSICAL EXAM:  Gen: NAD, well-developed  Head: Normocephalic, Atraumatic  Eyes: PERRL, EOMI, no scleral injection  Nose: Nares bilaterally patent, no discharge  Mouth: limited view due to MMF left submandibular swelling no obvious drainage or bleeding Mucosa moist,   Neck: s/p I&D +anterior neck edema, improving no overlying erythema or induration no obvious drainage or fluctuance from site, trachea midline, no masses  Resp: breathing easily, no stridor  CV: no peripheral edema/cyanosis    LABS:                        11.8   11.4  )-----------( 221      ( 04 Nov 2017 03:42 )             35.6 POD/STATUS POST/ENT ISSUE: POD #3 s/p I&D of sublingual abscess and MMF    INTERVAL HPI: 35 yo male history of ludwigs angina s/p fall resulting in left side mandible fracture POD #3 for I&D sublingual abscess and MMF. Pain well controlled, feels better today, speaking/conversating denies sob/neck pain/odynophagia    Vital Signs Last 24 Hrs  T(C): 36.7 (04 Nov 2017 11:00), Max: 37.6 (03 Nov 2017 15:00)  T(F): 98 (04 Nov 2017 11:00), Max: 99.7 (03 Nov 2017 15:00)  HR: 61 (04 Nov 2017 11:00) (55 - 94)  BP: 131/89 (04 Nov 2017 11:00) (105/58 - 135/77)  BP(mean): 104 (04 Nov 2017 11:00) (77 - 104)  RR: 22 (04 Nov 2017 11:00) (16 - 43)  SpO2: 97% (04 Nov 2017 11:00) (94% - 100%)    PHYSICAL EXAM:  Gen: NAD, well-developed  Head: Normocephalic, Atraumatic  Eyes: PERRL, EOMI, no scleral injection  Nose: Nares bilaterally patent, no discharge  Mouth: limited view due to MMF left submandibular swelling no obvious drainage or bleeding Mucosa moist,   Neck: s/p I&D +anterior neck edema, improving no overlying erythema or induration no obvious drainage or fluctuance from site, trachea midline, no masses  Resp: breathing easily, no stridor  CV: no peripheral edema/cyanosis    LABS:                        11.8   11.4  )-----------( 221      ( 04 Nov 2017 03:42 )             35.6

## 2017-11-04 NOTE — DISCHARGE NOTE ADULT - PATIENT PORTAL LINK FT
“You can access the [employer] Patient Portal, offered by [facility], by registering with the following website: [email or web address]”

## 2017-11-04 NOTE — PROGRESS NOTE ADULT - ASSESSMENT
36y POD #3 s/p I&D for submandibular abscess and jaw wired shut to correct left displaced ramus fracture, improving     Problem/Plan - 1:  ·  Problem: Submandibular abscess.  s/p I&D. increase in submandibular edema s/p penrose removal, probable postop changes, stable since AM  -cont Unasyn  -add Zosyn  -decadron 10mg IV q8hrs x 3 doses  -ok to transfer to floor from ENT standpoint  -clear liquid diet 36y POD #3 s/p I&D for submandibular abscess and jaw wired shut to correct left displaced ramus fracture, improving

## 2017-11-04 NOTE — DISCHARGE NOTE ADULT - HOSPITAL COURSE
37 y/o male with early lugwigs angina with right side of mouth sublingual abscess measuring 3 x 1 x 1.5 cm/anterior neck cellulitis/ Unilateral communicated displaced left sided ramus fracture. FOE indicative of supraglottitis. Per initial exam-Descending cellulitis of anterior neck and FOM tenderness and mild edema. (+) Left facial edema/ trismus. Pt taken to OR next day for MMF procedure to correct left displaced ramus fracture and incision and drainage of right submandibular abscess without any complications. Pt kept intubated nasogastrically for two days while under SICU care for airway observation and monitoring. Airway removed 11/3. No issues with airway since. Pt clinically improving. WBC trending down from initial 16.4 to 11.0 today. Pt clinically well and stable for discharge on PO clindamycin, pain medication and wire cutters for emergent use.

## 2017-11-04 NOTE — PROGRESS NOTE ADULT - ASSESSMENT
35 y/o M POD # 3 s/p I&D for submandibular abscess and MMD for left displaced ramus fracture. Pt improving. No complaints at this time. Tolerating liquid diet. Able to speak.

## 2017-11-04 NOTE — PROGRESS NOTE ADULT - SUBJECTIVE AND OBJECTIVE BOX
HISTORY  37 y/o male with no significant PMHx s/p fall on Sunday after tripping at home and hitting the left side of face on table. Patient presented to [facility] and was found to have left sided "jaw fracture and impacted wisdom tooth". He was told to follow up outpatient and was not prescribed antibiotics. For past 2 days, patient began spiking fevers with progressively worsening left sided facial swelling and redness extending to the neck. Unable to open jaw due to pain and swelling, causing him to be NPO. CT scan demonstrated Santana's angina with a left mandibular fracture so he was taken to the OR. Patient is s/p I&D of mandibular abscess and wiring of the mandible for the fracture. He was nasotracheally intubated and will remain intubated for approx. 2 days per ENT so SICU was consulted. ROS not obtained as patient is intubated & sedated.      24 HOUR EVENTS: pt extubated and advanced to CLD. After penrose drain was pulled, increased pain and swelling so pt now NPO again as per ENT, pressure dressing applied and given decadron x 1d.     SUBJECTIVE/ROS:  [ ] A ten-point review of systems was otherwise negative except as noted.  [ ] Due to altered mental status/intubation, subjective information were not able to be obtained from the patient. History was obtained, to the extent possible, from review of the chart and collateral sources of information.      NEURO  RASS:     GCS:     CAM ICU:  Exam: alert, follows commands  [x] Adequacy of sedation and pain control has been assessed and adjusted      RESPIRATORY  RR: 17 (11-04-17 @ 03:00) (14 - 43)  SpO2: 95% (11-04-17 @ 03:00) (94% - 100%)  Wt(kg): --  Exam: unlabored, clear to auscultation bilaterally  Mechanical Ventilation: Mode: CPAP with PS, RR (patient): 12, FiO2: 30, PEEP: 5, PS: 5, MAP: 8, PIP: 11  ABG - ( 03 Nov 2017 08:09 )  pH: 7.43  /  pCO2: 44    /  pO2: 226   / HCO3: 29    / Base Excess: 4.7   /  SaO2: 100     Lactate: x                [ ] Extubation Readiness Assessed  Meds:       CARDIOVASCULAR  HR: 57 (11-04-17 @ 03:00) (46 - 94)  BP: 121/61 (11-04-17 @ 03:00) (101/59 - 135/77)  BP(mean): 83 (11-04-17 @ 03:00) (73 - 102)  ABP: --  ABP(mean): --  Wt(kg): --  CVP(cm H2O): --      Exam:  Cardiac Rhythm: sinus  Perfusion     [ ]Adequate   [ ]Inadequate  Mentation   [ ]Normal       [ ]Reduced  Extremities  [ ]Warm         [ ]Cool  Volume Status [ ]Hypervolemic [ ]Euvolemic [ ]Hypovolemic  Meds:       GI/NUTRITION  Exam: soft, NT/ND  Diet: NPO  Meds:     GENITOURINARY  I&O's Detail    11-02 @ 07:01  -  11-03 @ 07:00  --------------------------------------------------------  IN:    fentaNYL  Infusion: 98.7 mL    IV PiggyBack: 100 mL    lactated ringers.: 2050 mL    propofol Infusion: 404 mL  Total IN: 2652.7 mL    OUT:    Indwelling Catheter - Urethral: 940 mL  Total OUT: 940 mL    Total NET: 1712.7 mL      11-03 @ 07:01  -  11-04 @ 04:03  --------------------------------------------------------  IN:    dextrose 5% + sodium chloride 0.45%.: 700 mL    fentaNYL  Infusion: 4.1 mL    IV PiggyBack: 650 mL    lactated ringers.: 1100 mL  Total IN: 2454.1 mL    OUT:    Indwelling Catheter - Urethral: 250 mL    Voided: 2450 mL  Total OUT: 2700 mL    Total NET: -245.9 mL          11-03    141  |  109<H>  |  19  ----------------------------<  104<H>  4.0   |  22  |  0.55    Ca    7.5<L>      03 Nov 2017 04:16  Phos  3.2     11-03  Mg     2.0     11-03      [ ] Ignacio catheter, indication: N/A  Meds: dextrose 5% + sodium chloride 0.45%. 500 milliLiter(s) IV Continuous <Continuous>        HEMATOLOGIC  Meds: enoxaparin Injectable 40 milliGRAM(s) SubCutaneous every 24 hours    [x] VTE Prophylaxis                        11.8   11.4  )-----------( 221      ( 04 Nov 2017 03:42 )             35.6       Transfusion     [ ] PRBC   [ ] Platelets   [ ] FFP   [ ] Cryoprecipitate      INFECTIOUS DISEASES  T(C): 37.3 (11-03-17 @ 23:00), Max: 37.6 (11-03-17 @ 15:00)  Wt(kg): --  WBC Count: 11.4 K/uL (11-04 @ 03:42)  WBC Count: 12.1 K/uL (11-03 @ 04:16)    Recent Cultures:  Specimen Source: Skin right nck abscess, 11-01 @ 17:23; Results   Few Coag Negative Staphylococcus; Gram Stain: --; Organism: --  Specimen Source: .Surgical Swab right neck abscess, 11-01 @ 16:35; Results   Few Coag Negative Staphylococcus; Gram Stain: --; Organism: --  Specimen Source: .Blood Blood, 11-01 @ 00:25; Results   Growth in aerobic bottle: Micrococcus luteus /lylae  Susceptibilites not performed.  ***Blood Panel PCR results on this specimen are available  approximately 3 hours after the Gram stain result.***  Gram stain, PCR, and/or culture results may not always  correspond due to difference in methodologies.  ************************************************************  This PCR assay was performed using [employer].  The following targets are tested for: Enterococcus,  vancomycin resistant enterococci, Listeria monocytogenes,  coagulase negative staphylococci, S. aureus,  methicillin resistant S. aureus, Streptococcus agalactiae  (Group B), S. pneumoniae, S. pyogenes (Group A),  Acinetobacter baumannii, Enterobacter cloacae, E. coli,  Klebsiella oxytoca, K. pneumoniae, Proteus sp.,  Serratia marcescens, Haemophilus influenzae,  Neisseria meningitidis, Pseudomonas aeruginosa, Candida  albicans, C. glabrata, C krusei, C parapsilosis,  C. tropicalis and the KPC resistance gene.; Gram Stain:   Growth in aerobic bottle: Gram Positive Cocci in Clusters; Organism: Blood Culture PCR    Meds: ampicillin/sulbactam  IVPB      ampicillin/sulbactam  IVPB 3 Gram(s) IV Intermittent every 6 hours  piperacillin/tazobactam IVPB. 3.375 Gram(s) IV Intermittent every 8 hours        ENDOCRINE  Capillary Blood Glucose    Meds: dexamethasone  IVPB 10 milliGRAM(s) IV Intermittent every 8 hours        ACCESS DEVICES:  [ ] Peripheral IV  [ ] Central Venous Line	[ ] R	[ ] L	[ ] IJ	[ ] Fem	[ ] SC	Placed:   [ ] Arterial Line		[ ] R	[ ] L	[ ] Fem	[ ] Rad	[ ] Ax	Placed:   [ ] PICC:					[ ] Mediport  [ ] Urinary Catheter, Date Placed:   [ ] Necessity of urinary, arterial, and venous catheters discussed    OTHER MEDICATIONS:  artificial  tears Solution 1 Drop(s) Both EYES every 8 hours PRN      CODE STATUS:     IMAGING:

## 2017-11-04 NOTE — DISCHARGE NOTE ADULT - ADDITIONAL INSTRUCTIONS
Continue liquid diet. Recommend 6-8 small liquids meals per day. Pain control as need. Motrin PO, Tylenol suspension. Finish entire course of Clindamycin PO x 10 day. Have wire cutters for emergent use in case of sudden issues with breathing. Immediately notify Dr. Bartholomew should this happen. Follow up with Dr. Bartholomew in 2 weeks. [phone].

## 2017-11-04 NOTE — DISCHARGE NOTE ADULT - MEDICATION SUMMARY - MEDICATIONS TO TAKE
I will START or STAY ON the medications listed below when I get home from the hospital:     mg oral tablet  -- 1 tab(s) by mouth every 4 hours   -- Do not take this drug if you are pregnant.  It is very important that you take or use this exactly as directed.  Do not skip doses or discontinue unless directed by your doctor.  May cause drowsiness or dizziness.  Obtain medical advice before taking any non-prescription drugs as some may affect the action of this medication.  Take with food or milk.    -- Indication: For pain    acetaminophen 160 mg/5 mL oral suspension  -- 32 milliliter(s) by mouth every 6 hours, As Needed -Mild Pain (1 - 3)   -- Indication: For pain    morphine 30 mg/12 hr oral capsule, extended release  -- 30 milligram(s) by mouth every 6 hours MDD:120 mg  -- Indication: For pain    Cleocin HCl 300 mg oral capsule  -- 1 cap(s) by mouth every 6 hours   -- Finish all this medication unless otherwise directed by prescriber.  Medication should be taken with plenty of water.    -- Indication: For infection I will START or STAY ON the medications listed below when I get home from the hospital:    acetaminophen 160 mg/5 mL oral suspension  -- 32 milliliter(s) by mouth every 6 hours, As Needed -Mild Pain (1 - 3)   -- Indication: For pain    Cleocin HCl 300 mg oral capsule  -- 1 cap(s) by mouth every 6 hours   -- Finish all this medication unless otherwise directed by prescriber.  Medication should be taken with plenty of water.    -- Indication: For infection

## 2017-11-04 NOTE — DISCHARGE NOTE ADULT - CARE PROVIDER_API CALL
Bruce Bartholomew), Otolaryngology  [address] [place] [address]  Phone: [phone]  Fax: [phone]

## 2017-11-04 NOTE — PROGRESS NOTE ADULT - SUBJECTIVE AND OBJECTIVE BOX
POD/STATUS POST/ENT ISSUE: s/p I&D of sublingual abscess and MMF POD #3        INTERVAL HPI: Pt denies any complaints at this time. Speaking in full sentences. Denies any severe pain, N/V, SOB, headaches, changes in vision.    Vital Signs Last 24 Hrs  T(C): 36.6 (04 Nov 2017 16:23), Max: 37.3 (03 Nov 2017 21:00)  T(F): 97.8 (04 Nov 2017 16:23), Max: 99.2 (03 Nov 2017 21:00)  HR: 65 (04 Nov 2017 16:23) (55 - 94)  BP: 147/83 (04 Nov 2017 16:23) (105/58 - 147/83)  BP(mean): 104 (04 Nov 2017 11:00) (77 - 104)  RR: 18 (04 Nov 2017 16:23) (16 - 43)  SpO2: 95% (04 Nov 2017 16:23) (94% - 98%)    PHYSICAL EXAM:  Gen: Awake and alert; lying supine; HOB elevated, NAD, well-developed. Able to speak in full sentences; no drooling, stridor.  Head: Normocephalic, Atraumatic  Eyes: PERRL, EOMI, no scleral injection  Nose: Nares bilaterally patent, no discharge  Mouth: Mucosa moist, limited view due to MMF. Wires in place b/l, top/bottom. No bleeding, drooling, or pus.  Neck: s/p I&D +anterior neck edema, improving no overlying erythema or induration no obvious drainage or fluctuance from site, trachea midline, no masses  Resp: breathing easily, no stridor  CV: no peripheral edema/cyanosis    LABS:                        11.8   11.4  )-----------( 221      ( 04 Nov 2017 03:42 )             35.6

## 2017-11-04 NOTE — DISCHARGE NOTE ADULT - PLAN OF CARE
Resolved with OR intervention Continue liquid diet. Recommend 6-8 small liquids meals per day. Pain control as need. Motrin PO, Tylenol suspension. Finish entire course of Clindamycin PO x 10 day. Have wire cutters for emergent use in case of sudden issues with breathing. Immediately notify Dr. Bartholomew should this happen. Follow up with Dr. Bartholomew in 2 weeks. [phone]. Finish course of antibiotics and follow up with Dr. Bartholomew in 2 weeks. [phone]. Improved and Resolved Finish course of antibiotics and follow up with Dr. Bartholomew in 2 weeks. [phone].

## 2017-11-05 LAB
ALBUMIN SERPL ELPH-MCNC: 3.6 G/DL — SIGNIFICANT CHANGE UP (ref 3.3–5)
ALP SERPL-CCNC: 74 U/L — SIGNIFICANT CHANGE UP (ref 40–120)
ALT FLD-CCNC: 23 U/L RC — SIGNIFICANT CHANGE UP (ref 10–45)
ANION GAP SERPL CALC-SCNC: 13 MMOL/L — SIGNIFICANT CHANGE UP (ref 5–17)
AST SERPL-CCNC: 28 U/L — SIGNIFICANT CHANGE UP (ref 10–40)
BILIRUB SERPL-MCNC: 0.5 MG/DL — SIGNIFICANT CHANGE UP (ref 0.2–1.2)
BUN SERPL-MCNC: 17 MG/DL — SIGNIFICANT CHANGE UP (ref 7–23)
CALCIUM SERPL-MCNC: 9 MG/DL — SIGNIFICANT CHANGE UP (ref 8.4–10.5)
CHLORIDE SERPL-SCNC: 101 MMOL/L — SIGNIFICANT CHANGE UP (ref 96–108)
CO2 SERPL-SCNC: 26 MMOL/L — SIGNIFICANT CHANGE UP (ref 22–31)
CREAT SERPL-MCNC: 0.7 MG/DL — SIGNIFICANT CHANGE UP (ref 0.5–1.3)
GLUCOSE SERPL-MCNC: 95 MG/DL — SIGNIFICANT CHANGE UP (ref 70–99)
HCT VFR BLD CALC: 36.9 % — LOW (ref 39–50)
HGB BLD-MCNC: 12.6 G/DL — LOW (ref 13–17)
MAGNESIUM SERPL-MCNC: 2.4 MG/DL — SIGNIFICANT CHANGE UP (ref 1.6–2.6)
MCHC RBC-ENTMCNC: 31.4 PG — SIGNIFICANT CHANGE UP (ref 27–34)
MCHC RBC-ENTMCNC: 34.1 GM/DL — SIGNIFICANT CHANGE UP (ref 32–36)
MCV RBC AUTO: 92.1 FL — SIGNIFICANT CHANGE UP (ref 80–100)
PHOSPHATE SERPL-MCNC: 3 MG/DL — SIGNIFICANT CHANGE UP (ref 2.5–4.5)
PLATELET # BLD AUTO: 283 K/UL — SIGNIFICANT CHANGE UP (ref 150–400)
POTASSIUM SERPL-MCNC: 3.5 MMOL/L — SIGNIFICANT CHANGE UP (ref 3.5–5.3)
POTASSIUM SERPL-SCNC: 3.5 MMOL/L — SIGNIFICANT CHANGE UP (ref 3.5–5.3)
PROT SERPL-MCNC: 7 G/DL — SIGNIFICANT CHANGE UP (ref 6–8.3)
RBC # BLD: 4.01 M/UL — LOW (ref 4.2–5.8)
RBC # FLD: 12.8 % — SIGNIFICANT CHANGE UP (ref 10.3–14.5)
SODIUM SERPL-SCNC: 140 MMOL/L — SIGNIFICANT CHANGE UP (ref 135–145)
WBC # BLD: 12.4 K/UL — HIGH (ref 3.8–10.5)
WBC # FLD AUTO: 12.4 K/UL — HIGH (ref 3.8–10.5)

## 2017-11-05 RX ORDER — ACETAMINOPHEN 500 MG
1000 TABLET ORAL ONCE
Qty: 0 | Refills: 0 | Status: COMPLETED | OUTPATIENT
Start: 2017-11-05 | End: 2017-11-05

## 2017-11-05 RX ADMIN — AMPICILLIN SODIUM AND SULBACTAM SODIUM 200 GRAM(S): 250; 125 INJECTION, POWDER, FOR SUSPENSION INTRAMUSCULAR; INTRAVENOUS at 11:57

## 2017-11-05 RX ADMIN — ENOXAPARIN SODIUM 40 MILLIGRAM(S): 100 INJECTION SUBCUTANEOUS at 20:06

## 2017-11-05 RX ADMIN — AMPICILLIN SODIUM AND SULBACTAM SODIUM 200 GRAM(S): 250; 125 INJECTION, POWDER, FOR SUSPENSION INTRAMUSCULAR; INTRAVENOUS at 23:35

## 2017-11-05 RX ADMIN — Medication 1000 MILLIGRAM(S): at 16:30

## 2017-11-05 RX ADMIN — MORPHINE SULFATE 2 MILLIGRAM(S): 50 CAPSULE, EXTENDED RELEASE ORAL at 08:56

## 2017-11-05 RX ADMIN — AMPICILLIN SODIUM AND SULBACTAM SODIUM 200 GRAM(S): 250; 125 INJECTION, POWDER, FOR SUSPENSION INTRAMUSCULAR; INTRAVENOUS at 05:28

## 2017-11-05 RX ADMIN — Medication 400 MILLIGRAM(S): at 16:05

## 2017-11-05 RX ADMIN — MORPHINE SULFATE 2 MILLIGRAM(S): 50 CAPSULE, EXTENDED RELEASE ORAL at 09:15

## 2017-11-05 RX ADMIN — AMPICILLIN SODIUM AND SULBACTAM SODIUM 200 GRAM(S): 250; 125 INJECTION, POWDER, FOR SUSPENSION INTRAMUSCULAR; INTRAVENOUS at 18:18

## 2017-11-05 NOTE — PROGRESS NOTE ADULT - ASSESSMENT
35 y/o M POD # 4 s/p I&D for submandibular abscess and MMF for left displaced ramus fracture, improved clinically  ,Tolerating liquid diet.

## 2017-11-05 NOTE — PROGRESS NOTE ADULT - SUBJECTIVE AND OBJECTIVE BOX
POD/STATUS POST/ENT ISSUE: s/p IXD of Submental ABscess and MMF POD 4    INTERVAL HPI: 35 yo male history of ludwigs angina s/p fall resulting in left side mandible fracture POD #4 for I&D sublingual abscess and MMF. pt states he has drainage of pus from surgical site, notes improvement in neck pain and swelling since it has been draining Actively  Denies any neck pain/f/c/n/v/bleeding from mouth speaking baseline tolerating liquids      Vital Signs Last 24 Hrs  T(C): 36.6 (05 Nov 2017 16:34), Max: 36.9 (04 Nov 2017 20:55)  T(F): 97.8 (05 Nov 2017 16:34), Max: 98.4 (04 Nov 2017 20:55)  HR: 64 (05 Nov 2017 16:34) (58 - 67)  BP: 117/71 (05 Nov 2017 16:34) (107/64 - 145/83)  RR: 18 (05 Nov 2017 16:34) (16 - 18)  SpO2: 95% (05 Nov 2017 16:34) (94% - 98%)    PHYSICAL EXAM:  Gen: NAD, well-developed ventilating/phonating well no stridor  Head: Normocephalic, Atraumatic  Eyes: PERRL, EOMI, no scleral injection  Nose: Nares bilaterally patent, no discharge  Mouth: MMF limited   Neck: s/p I&D of submental abscess drainage of pus from surgical site Deeply palpated surgical site, twith little expression of pus. Mild overlying erythema of neck no induration trachea midline, no masses neck landmarks visible  Resp: breathing easily, no stridor  CV: no peripheral edema/cyanosis    LABS:                        12.6   12.4  )-----------( 283      ( 05 Nov 2017 06:56 )             36.9

## 2017-11-05 NOTE — PROGRESS NOTE ADULT - SUBJECTIVE AND OBJECTIVE BOX
POD/STATUS POST/ENT ISSUE: POD #4 s/p I&D of sublingual abscess and MMF      INTERVAL HPI: Pt with drainage of pus from surgical site, and neck pain. denies f/c/n/v/ tolerating liquids, conversing  speaking full sentences no stridor pt states neck pain and swelling improved, after drainage started, feels better    Vital Signs Last 24 Hrs  T(C): 36.6 (05 Nov 2017 16:34), Max: 36.9 (04 Nov 2017 20:55)  T(F): 97.8 (05 Nov 2017 16:34), Max: 98.4 (04 Nov 2017 20:55)  HR: 64 (05 Nov 2017 16:34) (58 - 67)  BP: 117/71 (05 Nov 2017 16:34) (107/64 - 145/83)  RR: 18 (05 Nov 2017 16:34) (16 - 18)  SpO2: 95% (05 Nov 2017 16:34) (94% - 98%)    PHYSICAL EXAM:  Gen: NAD, well-developed no stridor, ventilating phonating baseline  Head: Normocephalic, Atraumatic  Eyes: PERRL, EOMI, no scleral injection  Nose: Nares bilaterally patent, no discharge  Mouth: limited MMF Mucosa moist  Neck: s/p I&D of submental abscess anterior neck swelling improving with drainage of pus. mild overlying erythema no induration,  neck dressing applied trachea midline, no masses neck landmarks visible  Resp: breathing easily, no stridor      LABS:                        12.6   12.4  )-----------( 283      ( 05 Nov 2017 06:56 )             36.9

## 2017-11-05 NOTE — PROGRESS NOTE ADULT - ASSESSMENT
37 y/o M POD # 4 s/p I&D for submandibular abscess and MMF for left displaced ramus fracture, improved clinically  ,Tolerating liquid diet, ventilating, phonating well

## 2017-11-05 NOTE — PROGRESS NOTE ADULT - ASSESSMENT
35 y/o M POD # 4 s/p I&D for submandibular abscess and MMF for left displaced ramus fracture, improved clinically  ,Tolerating liquid diet. Able to speak.

## 2017-11-05 NOTE — PROGRESS NOTE ADULT - SUBJECTIVE AND OBJECTIVE BOX
POD/STATUS POST/ENT ISSUE:  POD #4 s/p I&D of sublingual abscess and MMF    INTERVAL HPI: 37 yo male history of ludwigs angina s/p fall resulting in left side mandible fracture POD #4 for I&D sublingual abscess and MMF. Unable to converse w pt due to intubation and MMF.    INTERVAL HPI:     Vital Signs Last 24 Hrs  T(C): 36.5 (05 Nov 2017 09:03), Max: 36.9 (04 Nov 2017 20:55)  T(F): 97.7 (05 Nov 2017 09:03), Max: 98.4 (04 Nov 2017 20:55)  HR: 64 (05 Nov 2017 09:03) (58 - 67)  BP: 130/87 (05 Nov 2017 09:03) (107/64 - 147/83)  BP(mean): 104 (04 Nov 2017 11:00) (104 - 104)  RR: 18 (05 Nov 2017 09:03) (16 - 20)  SpO2: 97% (05 Nov 2017 09:03) (94% - 98%)    PHYSICAL EXAM:  Gen: NAD, well-developed  Head: Normocephalic, Atraumatic  Eyes: PERRL, EOMI, no scleral injection  Ears: Right - ear canal clear, TM intact without effusion            Left - ear canal clear, TM intact without effusion  Nose: Nares bilaterally patent, no discharge  Mouth: Mucosa moist, tongue/uvula midline, oropharynx clear  Neck: Flat, supple, no lymphadenopathy, trachea midline, no masses  Resp: breathing easily, no stridor  CV: no peripheral edema/cyanosis    LABS:                        12.6   12.4  )-----------( 283      ( 05 Nov 2017 06:56 )             36.9       IMAGING/ADDITIONAL STUDIES: POD/STATUS POST/ENT ISSUE:  POD #4 s/p I&D of sublingual abscess and MMF    INTERVAL HPI: 37 yo male history of ludwigs angina s/p fall resulting in left side mandible fracture POD #4 for I&D sublingual abscess and MMF. pt states he feels as if neck is swollen a little more than yesterday. Denies any neck pain/f/c/n/v/drainage from mouth speaking baseline tolerating liquids      Vital Signs Last 24 Hrs  T(C): 36.5 (05 Nov 2017 09:03), Max: 36.9 (04 Nov 2017 20:55)  T(F): 97.7 (05 Nov 2017 09:03), Max: 98.4 (04 Nov 2017 20:55)  HR: 64 (05 Nov 2017 09:03) (58 - 67)  BP: 130/87 (05 Nov 2017 09:03) (107/64 - 147/83)  BP(mean): 104 (04 Nov 2017 11:00) (104 - 104)  RR: 18 (05 Nov 2017 09:03) (16 - 20)  SpO2: 97% (05 Nov 2017 09:03) (94% - 98%)    PHYSICAL EXAM:  Gen: NAD, well-developed speech fluent  Head: Normocephalic, Atraumatic  Eyes: PERRL, EOMI, no scleral injection  Nose: Nares bilaterally patent, no discharge  Mouth:  Limited due to MMF, tongue soft, no obvious bleeding or drainage from mouth Mucosa moist tongue midline   Neck:  s/p I&D anterior neck abscess, surgical site/incision site c/d/i  with improved submental anterior neck swelling, neck landmarks visible no fluctuance or induration mild TTP trachea midline, no masses  Resp: breathing easily, no stridor  CV: no peripheral edema/cyanosis    LABS:                        12.6   12.4  )-----------( 283      ( 05 Nov 2017 06:56 )             36.9

## 2017-11-06 VITALS
SYSTOLIC BLOOD PRESSURE: 125 MMHG | TEMPERATURE: 97 F | DIASTOLIC BLOOD PRESSURE: 83 MMHG | OXYGEN SATURATION: 98 % | HEART RATE: 66 BPM | RESPIRATION RATE: 18 BRPM

## 2017-11-06 LAB
CULTURE RESULTS: SIGNIFICANT CHANGE UP
CULTURE RESULTS: SIGNIFICANT CHANGE UP
ORGANISM # SPEC MICROSCOPIC CNT: SIGNIFICANT CHANGE UP
ORGANISM # SPEC MICROSCOPIC CNT: SIGNIFICANT CHANGE UP
SPECIMEN SOURCE: SIGNIFICANT CHANGE UP
SPECIMEN SOURCE: SIGNIFICANT CHANGE UP

## 2017-11-06 PROCEDURE — 80048 BASIC METABOLIC PNL TOTAL CA: CPT

## 2017-11-06 PROCEDURE — 86850 RBC ANTIBODY SCREEN: CPT

## 2017-11-06 PROCEDURE — 71046 X-RAY EXAM CHEST 2 VIEWS: CPT

## 2017-11-06 PROCEDURE — 82803 BLOOD GASES ANY COMBINATION: CPT

## 2017-11-06 PROCEDURE — 85014 HEMATOCRIT: CPT

## 2017-11-06 PROCEDURE — 83735 ASSAY OF MAGNESIUM: CPT

## 2017-11-06 PROCEDURE — 96375 TX/PRO/DX INJ NEW DRUG ADDON: CPT | Mod: XU

## 2017-11-06 PROCEDURE — 80053 COMPREHEN METABOLIC PANEL: CPT

## 2017-11-06 PROCEDURE — 71045 X-RAY EXAM CHEST 1 VIEW: CPT

## 2017-11-06 PROCEDURE — 84100 ASSAY OF PHOSPHORUS: CPT

## 2017-11-06 PROCEDURE — 87186 SC STD MICRODIL/AGAR DIL: CPT

## 2017-11-06 PROCEDURE — 86900 BLOOD TYPING SEROLOGIC ABO: CPT

## 2017-11-06 PROCEDURE — 94002 VENT MGMT INPAT INIT DAY: CPT

## 2017-11-06 PROCEDURE — 85027 COMPLETE CBC AUTOMATED: CPT

## 2017-11-06 PROCEDURE — C1713: CPT

## 2017-11-06 PROCEDURE — 93005 ELECTROCARDIOGRAM TRACING: CPT

## 2017-11-06 PROCEDURE — 87040 BLOOD CULTURE FOR BACTERIA: CPT

## 2017-11-06 PROCEDURE — 70491 CT SOFT TISSUE NECK W/DYE: CPT

## 2017-11-06 PROCEDURE — 84132 ASSAY OF SERUM POTASSIUM: CPT

## 2017-11-06 PROCEDURE — 82962 GLUCOSE BLOOD TEST: CPT

## 2017-11-06 PROCEDURE — 99285 EMERGENCY DEPT VISIT HI MDM: CPT | Mod: 25

## 2017-11-06 PROCEDURE — 96374 THER/PROPH/DIAG INJ IV PUSH: CPT | Mod: XU

## 2017-11-06 PROCEDURE — 84295 ASSAY OF SERUM SODIUM: CPT

## 2017-11-06 PROCEDURE — 87150 DNA/RNA AMPLIFIED PROBE: CPT

## 2017-11-06 PROCEDURE — 86901 BLOOD TYPING SEROLOGIC RH(D): CPT

## 2017-11-06 PROCEDURE — 82330 ASSAY OF CALCIUM: CPT

## 2017-11-06 PROCEDURE — 83605 ASSAY OF LACTIC ACID: CPT

## 2017-11-06 PROCEDURE — 85730 THROMBOPLASTIN TIME PARTIAL: CPT

## 2017-11-06 PROCEDURE — 87070 CULTURE OTHR SPECIMN AEROBIC: CPT

## 2017-11-06 PROCEDURE — 85610 PROTHROMBIN TIME: CPT

## 2017-11-06 PROCEDURE — 82947 ASSAY GLUCOSE BLOOD QUANT: CPT

## 2017-11-06 PROCEDURE — 82435 ASSAY OF BLOOD CHLORIDE: CPT

## 2017-11-06 RX ADMIN — AMPICILLIN SODIUM AND SULBACTAM SODIUM 200 GRAM(S): 250; 125 INJECTION, POWDER, FOR SUSPENSION INTRAMUSCULAR; INTRAVENOUS at 05:05

## 2017-11-06 NOTE — PROGRESS NOTE ADULT - PROBLEM SELECTOR PROBLEM 1
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
Ludwigs angina
Sublingual abscess
Ludwigs angina
Sublingual abscess

## 2017-11-06 NOTE — PROGRESS NOTE ADULT - PROBLEM SELECTOR PLAN 2
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  Plan: MMF x 6 weeks  Liquid diet  Pan control prn Tylenol IV x 1  F/U outpatient with ENT Dr Bartholomew [phone]  D/W Dr Bartholomew.
MMF x 6 weeks  Liquid diet  Pan control prn Tylenol IV x 1  F/U outpatient with ENT Dr Bartholomew [phone]  D/W Dr Bartholomew
MMF x 6 weeks  Liquid diet  Pan control prn Tylenol IV x 1  F/U outpatient with ENT Dr Bartholomew [phone]  D/W Dr Bartholomew.
MMF x 6 weeks  Pain control prn  Clear liquids
MMF x 6 weeks  Liquid diet  Pan control prn  F/U outpatient with ENT Dr Bartholomew [phone]  D/W Dr Bartholomew

## 2017-11-06 NOTE — PROGRESS NOTE ADULT - PROBLEM SELECTOR PLAN 1
-MMF x 6 weeks  -Pain control prn  -Clear liquids  -Pt cleared for discharge in AM on Clindamycin PO x 10 day, pain control and wire cutters for emergent use.  F/U w/ Dr. Bartholomew in 2 weeks outpatient   -Discussed with Dr. Bartholomew
Cleared for discharge d/w Dr Bartholomew  F/U outpatient with Dr Bartholomew in 2 weeks [phone]  Continue ABX Clindamycin PO x 10 days  PO/Liquid Pain control prn  Liquid diet  Above discussed with patient and family in detail by Dr Bartholomew, and myself, verbalized understanding.
Cleared for discharge d/w Dr Bartholomew  F/U outpatient with Dr Bartholomew in 2 weeks [phone]  Continue ABX Clindamycin PO x 10 days  PO/Liquid Pain control prn  Liquid diet  Warm compresses to anterior neck  Above discussed with patient and family in detail by Dr Bartholomew, and myself, verbalized understanding.
Cleared for discharge d/w Dr Bratholomew  F/U outpatient with Dr Bartholomew in 2 weeks [phone]  Continue ABX Clindamycin PO x 10 days  PO/Liquid Pain control prn  Liquid diet  Warm compresses to anterior neck  Above discussed with patient and family in detail by Dr Bartholomew, and myself, verbalized understanding.
Continue ABX  D/C Decadron  Pain control prn  F/U Cultures   OK to txfer to floor
f/u culture results  continue pain management as per SICU  pt is to be transferred to [facility] SICU today am for further management
Cleared for discharge d/w Dr Bartholomew  F/U outpatient with Dr Bartholomew in 2 weeks [phone]  Continue ABX Clindamycin PO x 10 days  PO/Liquid Pain control prn  Liquid diet  Above discussed with patient and family in detail by Dr Bartholomew, and myself, verbalized understanding
f/u culture results  continue pain management as per SICU  pt is to be transferred to [facility] SICU tomorrow am for further management

## 2017-11-06 NOTE — PROGRESS NOTE ADULT - SUBJECTIVE AND OBJECTIVE BOX
POD/STATUS POST/ENT ISSUE: s/p IXD of Submental ABscess and MMF POD 5    INTERVAL HPI: 35 yo male history of ludwigs angina s/p fall resulting in left side mandible fracture POD #5 for I&D sublingual abscess and MMF. Pt states he has drainage of pus from surgical site for the past two days with improvement this am.  Denies any neck pain, fevers, n/v, bleeding from mouth. Tolerating liquids, speaking well.    Vital Signs Last 24 Hrs  T(C): 36.5 (06 Nov 2017 05:00), Max: 36.6 (05 Nov 2017 16:34)  T(F): 97.7 (06 Nov 2017 05:00), Max: 97.8 (05 Nov 2017 16:34)  HR: 73 (06 Nov 2017 05:00) (58 - 73)  BP: 132/92 (06 Nov 2017 05:00) (117/71 - 145/83)  BP(mean): --  RR: 18 (06 Nov 2017 05:00) (17 - 18)  SpO2: 95% (06 Nov 2017 05:00) (95% - 98%)    PHYSICAL EXAM:  Gen: NAD, well-developed  Head: Normocephalic, Atraumatic  Eyes: PERRL, EOMI, no scleral injection  Nose: Nares bilaterally patent, no discharge  Mouth: MMF with wires intact, gums pink, no bleeding   Neck: Mild swelling, nonerythematous, no induration, mild blood tinged pus expressed from incision site with deep palpation, no lymphadenopathy, trachea midline  Resp: no stridor  CV: no peripheral edema/cyanosis    LABS:                        12.6   12.4  )-----------( 283      ( 05 Nov 2017 06:56 )             36.9

## 2017-11-06 NOTE — PROGRESS NOTE ADULT - ASSESSMENT
35 y/o M POD # 5 s/p I&D for submandibular abscess and MMF for left displaced ramus fracture, improved clinically, tolerating liquid diet, ventilating, phonating well. Afebrile.

## 2017-11-06 NOTE — PROGRESS NOTE ADULT - PROBLEM SELECTOR PROBLEM 2
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

## 2017-11-08 LAB
CULTURE RESULTS: SIGNIFICANT CHANGE UP
CULTURE RESULTS: SIGNIFICANT CHANGE UP
SPECIMEN SOURCE: SIGNIFICANT CHANGE UP
SPECIMEN SOURCE: SIGNIFICANT CHANGE UP

## 2018-03-03 ENCOUNTER — OUTPATIENT (OUTPATIENT)
Dept: OUTPATIENT SERVICES | Facility: HOSPITAL | Age: 38
LOS: 1 days | End: 2018-03-03
Payer: COMMERCIAL

## 2018-03-03 ENCOUNTER — APPOINTMENT (OUTPATIENT)
Dept: CT IMAGING | Facility: CLINIC | Age: 38
End: 2018-03-03
Payer: COMMERCIAL

## 2018-03-03 DIAGNOSIS — Z00.8 ENCOUNTER FOR OTHER GENERAL EXAMINATION: ICD-10-CM

## 2018-03-03 PROCEDURE — 70486 CT MAXILLOFACIAL W/O DYE: CPT | Mod: 26

## 2018-03-03 PROCEDURE — 70486 CT MAXILLOFACIAL W/O DYE: CPT

## 2018-03-10 ENCOUNTER — OUTPATIENT (OUTPATIENT)
Dept: OUTPATIENT SERVICES | Facility: HOSPITAL | Age: 38
LOS: 1 days | End: 2018-03-10
Payer: COMMERCIAL

## 2018-03-10 ENCOUNTER — APPOINTMENT (OUTPATIENT)
Dept: RADIOLOGY | Facility: CLINIC | Age: 38
End: 2018-03-10
Payer: COMMERCIAL

## 2018-03-10 DIAGNOSIS — Z00.8 ENCOUNTER FOR OTHER GENERAL EXAMINATION: ICD-10-CM

## 2018-03-10 PROCEDURE — 71046 X-RAY EXAM CHEST 2 VIEWS: CPT

## 2018-03-10 PROCEDURE — 71046 X-RAY EXAM CHEST 2 VIEWS: CPT | Mod: 26
